# Patient Record
Sex: MALE | Race: WHITE | NOT HISPANIC OR LATINO | URBAN - METROPOLITAN AREA
[De-identification: names, ages, dates, MRNs, and addresses within clinical notes are randomized per-mention and may not be internally consistent; named-entity substitution may affect disease eponyms.]

---

## 2017-01-25 ENCOUNTER — OFFICE VISIT (OUTPATIENT)
Dept: OPTOMETRY | Facility: CLINIC | Age: 47
End: 2017-01-25
Payer: COMMERCIAL

## 2017-01-25 DIAGNOSIS — H52.203 MYOPIA WITH ASTIGMATISM AND PRESBYOPIA, BILATERAL: Primary | ICD-10-CM

## 2017-01-25 DIAGNOSIS — H52.4 MYOPIA WITH ASTIGMATISM AND PRESBYOPIA, BILATERAL: Primary | ICD-10-CM

## 2017-01-25 DIAGNOSIS — H40.053 OCULAR HYPERTENSION, BILATERAL: ICD-10-CM

## 2017-01-25 DIAGNOSIS — H52.13 MYOPIA WITH ASTIGMATISM AND PRESBYOPIA, BILATERAL: Primary | ICD-10-CM

## 2017-01-25 PROCEDURE — 92015 DETERMINE REFRACTIVE STATE: CPT | Performed by: OPTOMETRIST

## 2017-01-25 PROCEDURE — 92014 COMPRE OPH EXAM EST PT 1/>: CPT | Performed by: OPTOMETRIST

## 2017-01-25 ASSESSMENT — REFRACTION_MANIFEST
OS_AXIS: 009
OS_SPHERE: -2.50
OD_AXIS: 117
OD_AXIS: 155
OS_ADD: +2.00
METHOD_AUTOREFRACTION: 1
OD_CYLINDER: +1.00
OD_SPHERE: -2.50
OS_SPHERE: -2.25
OD_ADD: +2.00
OS_CYLINDER: +1.50
OS_CYLINDER: +0.50
OS_AXIS: 17
OD_SPHERE: -1.75
OD_CYLINDER: +0.25

## 2017-01-25 ASSESSMENT — SLIT LAMP EXAM - LIDS
COMMENTS: 1+ MEIBOMIAN GLAND DYSFUNCTION
COMMENTS: 1+ MEIBOMIAN GLAND DYSFUNCTION

## 2017-01-25 ASSESSMENT — REFRACTION_WEARINGRX
OS_CYLINDER: SPHERE
OD_CYLINDER: SPHERE
OD_SPHERE: -1.50
SPECS_TYPE: SVL
OS_SPHERE: -2.00

## 2017-01-25 ASSESSMENT — VISUAL ACUITY
OD_SC: 20/20-3
OS_CC: 20/30
OS_SC: 20/30-1
OD_CC: 20/20
OD_SC: 20/150
OS_SC: 20/150
METHOD: SNELLEN - LINEAR
OS_CC+: -1

## 2017-01-25 ASSESSMENT — EXTERNAL EXAM - LEFT EYE: OS_EXAM: NORMAL

## 2017-01-25 ASSESSMENT — EXTERNAL EXAM - RIGHT EYE: OD_EXAM: NORMAL

## 2017-01-25 ASSESSMENT — CUP TO DISC RATIO
OD_RATIO: 0.2
OS_RATIO: 0.2

## 2017-01-25 ASSESSMENT — CONF VISUAL FIELD
METHOD: COUNTING FINGERS
OS_NORMAL: 1
OD_NORMAL: 1

## 2017-01-25 ASSESSMENT — TONOMETRY
IOP_METHOD: APPLANATION
OS_IOP_MMHG: 23
OD_IOP_MMHG: 23

## 2017-01-25 NOTE — PROGRESS NOTES
Chief Complaint   Patient presents with     COMPREHENSIVE EYE EXAM         Last Eye Exam: 10/29/2015  Dilated Previously: Yes    What are you currently using to see?  glasses       Distance Vision Acuity: Satisfied with vision    Near Vision Acuity: Satisfied with vision while reading  unaided    Eye Comfort: good and when tired right eye will feel like it itchs  Do you use eye drops? : No  Occupation or Hobbies: [employer]    [facility]  [employer]           Medical, surgical and family histories reviewed and updated 1/25/2017.       OBJECTIVE: See Ophthalmology exam    ASSESSMENT:    ICD-10-CM    1. Myopia with astigmatism and presbyopia, bilateral H52.13 EYE EXAM (SIMPLE-NONBILLABLE)    H52.203 REFRACTION   2. Ocular hypertension, bilateral H40.053 EYE EXAM (SIMPLE-NONBILLABLE)      PLAN:     Patient Instructions   Prescription given for glasses.    Your eyes may be blurry at near and sensitive to light for several hours from the dilating drops.    Yearly eye exams recommended.

## 2017-01-25 NOTE — PATIENT INSTRUCTIONS
Prescription given for glasses.    Your eyes may be blurry at near and sensitive to light for several hours from the dilating drops.    Yearly eye exams recommended.

## 2018-02-12 ENCOUNTER — OFFICE VISIT (OUTPATIENT)
Dept: FAMILY MEDICINE | Facility: CLINIC | Age: 48
End: 2018-02-12
Payer: COMMERCIAL

## 2018-02-12 ENCOUNTER — RADIANT APPOINTMENT (OUTPATIENT)
Dept: GENERAL RADIOLOGY | Facility: CLINIC | Age: 48
End: 2018-02-12
Attending: NURSE PRACTITIONER
Payer: COMMERCIAL

## 2018-02-12 VITALS
HEIGHT: 72 IN | SYSTOLIC BLOOD PRESSURE: 138 MMHG | WEIGHT: 239.4 LBS | DIASTOLIC BLOOD PRESSURE: 87 MMHG | BODY MASS INDEX: 32.43 KG/M2 | TEMPERATURE: 97.5 F | OXYGEN SATURATION: 99 % | HEART RATE: 76 BPM

## 2018-02-12 DIAGNOSIS — M25.562 CHRONIC PAIN OF LEFT KNEE: ICD-10-CM

## 2018-02-12 DIAGNOSIS — M25.561 CHRONIC PAIN OF BOTH KNEES: ICD-10-CM

## 2018-02-12 DIAGNOSIS — M25.562 CHRONIC PAIN OF BOTH KNEES: ICD-10-CM

## 2018-02-12 DIAGNOSIS — G89.29 CHRONIC PAIN OF LEFT KNEE: Primary | ICD-10-CM

## 2018-02-12 DIAGNOSIS — G89.29 CHRONIC PAIN OF LEFT KNEE: ICD-10-CM

## 2018-02-12 DIAGNOSIS — G89.29 CHRONIC PAIN OF BOTH KNEES: ICD-10-CM

## 2018-02-12 DIAGNOSIS — M25.562 CHRONIC PAIN OF LEFT KNEE: Primary | ICD-10-CM

## 2018-02-12 LAB
ERYTHROCYTE [SEDIMENTATION RATE] IN BLOOD BY WESTERGREN METHOD: 3 MM/H (ref 0–15)
URATE SERPL-MCNC: 5.1 MG/DL (ref 3.5–7.2)

## 2018-02-12 PROCEDURE — 73562 X-RAY EXAM OF KNEE 3: CPT | Mod: LT

## 2018-02-12 PROCEDURE — 86618 LYME DISEASE ANTIBODY: CPT | Performed by: NURSE PRACTITIONER

## 2018-02-12 PROCEDURE — 85652 RBC SED RATE AUTOMATED: CPT | Performed by: NURSE PRACTITIONER

## 2018-02-12 PROCEDURE — 36415 COLL VENOUS BLD VENIPUNCTURE: CPT | Performed by: NURSE PRACTITIONER

## 2018-02-12 PROCEDURE — 86038 ANTINUCLEAR ANTIBODIES: CPT | Performed by: NURSE PRACTITIONER

## 2018-02-12 PROCEDURE — 99214 OFFICE O/P EST MOD 30 MIN: CPT | Performed by: NURSE PRACTITIONER

## 2018-02-12 PROCEDURE — 84550 ASSAY OF BLOOD/URIC ACID: CPT | Performed by: NURSE PRACTITIONER

## 2018-02-12 PROCEDURE — 86431 RHEUMATOID FACTOR QUANT: CPT | Performed by: NURSE PRACTITIONER

## 2018-02-12 RX ORDER — SODIUM PHOSPHATE,MONO-DIBASIC 19G-7G/118
1 ENEMA (ML) RECTAL DAILY
Qty: 90 CAPSULE | Refills: 1 | Status: SHIPPED | OUTPATIENT
Start: 2018-02-12 | End: 2020-04-13

## 2018-02-12 NOTE — NURSING NOTE
Chief Complaint   Patient presents with     Musculoskeletal Problem       Initial /87  Pulse 76  Temp 97.5  F (36.4  C) (Oral)  Ht 6' (1.829 m)  Wt 239 lb 6.4 oz (108.6 kg)  SpO2 99%  BMI 32.47 kg/m2 Estimated body mass index is 32.47 kg/(m^2) as calculated from the following:    Height as of this encounter: 6' (1.829 m).    Weight as of this encounter: 239 lb 6.4 oz (108.6 kg).  Medication Reconciliation: complete     Jacqui Mendez MA

## 2018-02-12 NOTE — PROGRESS NOTES
SUBJECTIVE:   Ata Garcia is a 47 year old male who presents to clinic today for the following health issues:      Muscle/Joint Pain     Onset: ongoing for years-worsened 1 week    Description:   Location: left knee pain, now starting in R knee  Character: Sharp    Progression of Symptoms: worse    Accompanying Signs & Symptoms:  Other symptoms: pain worse when sitting for long periods of time, for example in car.  Pain behind knee cap    Precipitating factors:   Trauma or overuse: no     Alleviating factors:  Improved by: nothing  Therapies Tried and outcome: aleve, ibu, tylenol       Problem list and histories reviewed & adjusted, as indicated.  Additional history: as documented    Patient Active Problem List   Diagnosis     CARDIOVASCULAR SCREENING; LDL GOAL LESS THAN 160     Ocular hypertension     Obesity, Class I, BMI 30-34.9     Past Surgical History:   Procedure Laterality Date     COLONOSCOPY WITH CO2 INSUFFLATION N/A 10/5/2016    Procedure: COLONOSCOPY WITH CO2 INSUFFLATION;  Surgeon: Glenn Joiner DO;  Location: [facility] OR     HC REMOVAL GALLBLADDER  10/2004     TONSILLECTOMY & ADENOIDECTOMY  2001    for RAJIV     UVULECTOMY  2001    for RAJIV     VASECTOMY  2002       Social History   Substance Use Topics     Smoking status: Never Smoker     Smokeless tobacco: Never Used     Alcohol use Yes      Comment: occasionally     Family History   Problem Relation Age of Onset     Lymphoma Father      non-H     DIABETES Father      type 2     Breast Cancer Sister      DIABETES Maternal Grandfather      type 1     Glaucoma No family hx of      Macular Degeneration No family hx of      Hypertension No family hx of      CEREBROVASCULAR DISEASE No family hx of      Thyroid Disease No family hx of      Coronary Artery Disease No family hx of          Current Outpatient Prescriptions   Medication Sig Dispense Refill     glucosamine-chondroitin (GLUCOSAMINE CHONDR COMPLEX) 500-400 MG CAPS per capsule Take 1 capsule by  mouth daily 90 capsule 1     diclofenac (VOLTAREN) 50 MG EC tablet Take 1 tablet (50 mg) by mouth 3 times daily as needed for moderate pain 30 tablet 1     No Known Allergies  BP Readings from Last 3 Encounters:   02/12/18 138/87   10/05/16 118/77   09/13/16 119/82    Wt Readings from Last 3 Encounters:   02/12/18 239 lb 6.4 oz (108.6 kg)   09/30/16 218 lb (98.9 kg)   09/13/16 221 lb (100.2 kg)                  Labs reviewed in EPIC    Reviewed and updated as needed this visit by clinical staff  Tobacco  Meds  Med Hx  Surg Hx  Fam Hx  Soc Hx      Reviewed and updated as needed this visit by Provider         ROS:  Constitutional, HEENT, cardiovascular, pulmonary, GI, , musculoskeletal, neuro, skin, endocrine and psych systems are negative, except as otherwise noted.    OBJECTIVE:     /87  Pulse 76  Temp 97.5  F (36.4  C) (Oral)  Ht 6' (1.829 m)  Wt 239 lb 6.4 oz (108.6 kg)  SpO2 99%  BMI 32.47 kg/m2  Body mass index is 32.47 kg/(m^2).  GENERAL: healthy, alert and no distress  RESP: lungs clear to auscultation - no rales, rhonchi or wheezes  CV: regular rate and rhythm, normal S1 S2, no S3 or S4, no murmur, click or rub, no peripheral edema and peripheral pulses strong  MS: no gross musculoskeletal defects noted, no edema No pain with palpation of knees, no pain with passive ROM.   SKIN: no suspicious lesions or rashes  NEURO: Normal strength and tone, mentation intact and speech normal    Diagnostic Test Results:  See orders    ASSESSMENT/PLAN:         ICD-10-CM    1. Chronic pain of left knee M25.562 XR Knee Left 3 Views    G89.29     worsening   2. Chronic pain of both knees M25.561 Lyme Disease Scarlet with reflex to WB Serum    M25.562 Anti Nuclear Scarlet IgG by IFA with Reflex    G89.29 Rheumatoid factor     Erythrocyte sedimentation rate auto     Uric acid     glucosamine-chondroitin (GLUCOSAMINE CHONDR COMPLEX) 500-400 MG CAPS per capsule     diclofenac (VOLTAREN) 50 MG EC tablet    left first, now  right too       See Patient Instructions: Your xray looks normal to me, I will notify you if the radiologist sees any abnormalities.  I will let you know your lab results when I get them back, and we can go from there based on treatment.  Follow up if your symptoms worsen.     Jane Dwyer, LEAH  [facility]

## 2018-02-12 NOTE — PATIENT INSTRUCTIONS
Your xray looks normal to me, I will notify you if the radiologist sees any abnormalities.  I will let you know your lab results when I get them back, and we can go from there based on treatment.  Follow up if your symptoms worsen.   Reducing Knee Pain and Swelling    Many treatments can help reduce pain and swelling in your knee. Your healthcare provider or physical therapist may suggest one or more of the following treatments:    Icing your knee helps reduce swelling. You may be asked to ice your knee once a day or more. Apply ice for about 15 to 20 minutes at a time, with at least 40 minutes between sessions. Always keep a towel between the ice and your skin.     Keeping your leg raised above your heart helps excess fluid flow out of your knee joint. This reduces swelling.    Compression means wrapping an elastic bandage or neoprene sleeve snugly around your knees. This keeps fluid from collecting in your knee joint.    Electrical stimulation, done by a physical therapist or , can help reduce excess fluid in your knee joint.    Anti-inflammatory medicines may be prescribed by your healthcare provider. You may take pills or receive injections in your knee.    Isometric (mariaa) exercises strengthen the muscles that support your knee joint. They also help reduce excess fluid in your knee.    Massage helps fluid drain away from your knee.  Date Last Reviewed: 10/13/2015    3893-3203 [employer]. [address] [place] [address], [place] [address]. All rights reserved. This information is not intended as a substitute for professional medical care. Always follow your healthcare professional's instructions.            Understanding Osteoarthritis of the Knee    A joint is a place where two bones meet. The knee is called a hinge joint. This joint is formed where the thighbone (femur) meets the shinbone (tibia). A healthy knee joint bends freely. Knee osteoarthritis is a condition where parts of  the knee joint wear out. This can lead to pain, stiffness, and limited movement.   What is osteoarthritis?  Every joint contains a smooth tissue called cartilage. Cartilage cushions the ends of bones and helps bones in a joint glide smoothly against each other. Knee osteoarthritis occurs when cartilage in the knee joint begins to break down and wear away. Bones may become exposed and rub together. The cartilage may become irritated and rough. This prevents smooth movement of the joint and can lead to pain.  Causes of osteoarthritis of the knee  Causes can include:    Wear and tear from normal use over time    Overuse of the knee during sports or work activities    Being overweight. This increases stress on the knee joint.    Misalignment of the knee joint    Injury to the knee  Symptoms of osteoarthritis of the knee  Common symptoms include:    Pain and swelling around the joint. The pain and swelling get worse with activity and better with rest.    Grinding sound when moving the knee    Reduced knee movement    Knee stiffness. This is often worse first thing in the morning.  Treating osteoarthritis of the knee  Osteoarthritis is a long-term condition. Treatment usually focuses on managing symptoms. Treatment may include:    Over-the-counter or prescription medicines taken by mouth to help relieve pain and swelling    Injections of medicine into the joint to help relieve symptoms for a time    A weight-loss plan for people who are overweight    A plan of physical therapy and exercises to improve the strength and flexibility of the muscles around the knee    Heat or cold therapy to help relieve pain and stiffness    Assistive devices that help with movement, such as a cane or a walker    Assistive devices that make activities of daily life easier, such as raised toilet seats or shower bars  If other treatments don t do enough to relieve symptoms, you may need surgery to replace the joint. During this surgery, the  damaged joint is removed. An artificial knee joint is then put into place. This can help relieve pain and stiffness and restore movement of the knee.     When to call your healthcare provider  Call your healthcare provider right away if you have any of these:    Fever of 100.4 F (38 C) or higher, or as directed    Symptoms that don t get better with prescribed medicines or get worse    New symptoms   Date Last Reviewed: 3/10/2016    [id number] [employer]. [address] [place] [address], [place] [address]. All rights reserved. This information is not intended as a substitute for professional medical care. Always follow your healthcare professional's instructions.

## 2018-02-13 LAB
ANA SER QL IF: NEGATIVE
B BURGDOR IGG+IGM SER QL: 0.01 (ref 0–0.89)
RHEUMATOID FACT SER NEPH-ACNC: <20 IU/ML (ref 0–20)

## 2018-02-13 NOTE — PROGRESS NOTES
Jorge Berumen,    Thank you for your recent office visit.    Here are your recent results.  Lab results normal.     Feel free to contact me via [employer] or call the clinic at [phone].    Sincerely,    WILLY Lazcano, FNP-BC

## 2018-02-13 NOTE — PROGRESS NOTES
Jorge Berumen,    Thank you for your recent office visit.    Here are your recent results.  Xray showed: Minimal quadriceps and patellar spurring.  If you would like a referral for orthopedics to further discuss this, please let me know.     Feel free to contact me via [employer] or call the clinic at [phone].    Sincerely,    WILLY Lazcano, FNP-BC

## 2018-08-31 ENCOUNTER — OFFICE VISIT (OUTPATIENT)
Dept: FAMILY MEDICINE | Facility: CLINIC | Age: 48
End: 2018-08-31
Payer: COMMERCIAL

## 2018-08-31 ENCOUNTER — RADIANT APPOINTMENT (OUTPATIENT)
Dept: GENERAL RADIOLOGY | Facility: CLINIC | Age: 48
End: 2018-08-31
Attending: FAMILY MEDICINE
Payer: COMMERCIAL

## 2018-08-31 VITALS
HEIGHT: 72 IN | DIASTOLIC BLOOD PRESSURE: 83 MMHG | BODY MASS INDEX: 32.23 KG/M2 | TEMPERATURE: 98.6 F | WEIGHT: 238 LBS | SYSTOLIC BLOOD PRESSURE: 134 MMHG | OXYGEN SATURATION: 97 % | HEART RATE: 73 BPM | RESPIRATION RATE: 20 BRPM

## 2018-08-31 DIAGNOSIS — R10.12 LUQ ABDOMINAL PAIN: ICD-10-CM

## 2018-08-31 DIAGNOSIS — R07.9 LEFT SIDED CHEST PAIN: ICD-10-CM

## 2018-08-31 DIAGNOSIS — R07.9 LEFT SIDED CHEST PAIN: Primary | ICD-10-CM

## 2018-08-31 DIAGNOSIS — Z11.4 SCREENING FOR HUMAN IMMUNODEFICIENCY VIRUS: ICD-10-CM

## 2018-08-31 DIAGNOSIS — Z23 NEED FOR PROPHYLACTIC VACCINATION AND INOCULATION AGAINST INFLUENZA: ICD-10-CM

## 2018-08-31 LAB
BASOPHILS # BLD AUTO: 0.1 10E9/L (ref 0–0.2)
BASOPHILS NFR BLD AUTO: 0.6 %
DIFFERENTIAL METHOD BLD: NORMAL
EOSINOPHIL # BLD AUTO: 0.2 10E9/L (ref 0–0.7)
EOSINOPHIL NFR BLD AUTO: 1.7 %
ERYTHROCYTE [DISTWIDTH] IN BLOOD BY AUTOMATED COUNT: 13.1 % (ref 10–15)
HCT VFR BLD AUTO: 44.7 % (ref 40–53)
HGB BLD-MCNC: 15.7 G/DL (ref 13.3–17.7)
LYMPHOCYTES # BLD AUTO: 2.4 10E9/L (ref 0.8–5.3)
LYMPHOCYTES NFR BLD AUTO: 27.7 %
MCH RBC QN AUTO: 29.5 PG (ref 26.5–33)
MCHC RBC AUTO-ENTMCNC: 35.1 G/DL (ref 31.5–36.5)
MCV RBC AUTO: 84 FL (ref 78–100)
MONOCYTES # BLD AUTO: 0.7 10E9/L (ref 0–1.3)
MONOCYTES NFR BLD AUTO: 7.5 %
NEUTROPHILS # BLD AUTO: 5.5 10E9/L (ref 1.6–8.3)
NEUTROPHILS NFR BLD AUTO: 62.5 %
PLATELET # BLD AUTO: 188 10E9/L (ref 150–450)
RBC # BLD AUTO: 5.33 10E12/L (ref 4.4–5.9)
WBC # BLD AUTO: 8.8 10E9/L (ref 4–11)

## 2018-08-31 PROCEDURE — 71046 X-RAY EXAM CHEST 2 VIEWS: CPT | Mod: FY

## 2018-08-31 PROCEDURE — 87389 HIV-1 AG W/HIV-1&-2 AB AG IA: CPT | Performed by: FAMILY MEDICINE

## 2018-08-31 PROCEDURE — 99214 OFFICE O/P EST MOD 30 MIN: CPT | Mod: 25 | Performed by: FAMILY MEDICINE

## 2018-08-31 PROCEDURE — 90471 IMMUNIZATION ADMIN: CPT | Performed by: FAMILY MEDICINE

## 2018-08-31 PROCEDURE — 90686 IIV4 VACC NO PRSV 0.5 ML IM: CPT | Performed by: FAMILY MEDICINE

## 2018-08-31 PROCEDURE — 85025 COMPLETE CBC W/AUTO DIFF WBC: CPT | Performed by: FAMILY MEDICINE

## 2018-08-31 PROCEDURE — 36415 COLL VENOUS BLD VENIPUNCTURE: CPT | Performed by: FAMILY MEDICINE

## 2018-08-31 ASSESSMENT — PAIN SCALES - GENERAL: PAINLEVEL: MILD PAIN (2)

## 2018-08-31 NOTE — PATIENT INSTRUCTIONS
At [facility], we strive to deliver an exceptional experience to you, every time we see you.  If you receive a survey in the mail, please send us back your thoughts. We really do value your feedback.    Based on your medical history, these are the current health maintenance/preventive care services that you are due for (some may have been done at this visit.)  Health Maintenance Due   Topic Date Due     HIV SCREEN (SYSTEM ASSIGNED)  06/29/1988     PHQ-2 Q1 YR  09/13/2017         Suggested websites for health information:  Www.[facility].org : Up to date and easily searchable information on multiple topics.  Www.medlineplus.gov : medication info, interactive tutorials, watch real surgeries online  Www.familydoctor.org : good info from the Academy of Family Physicians  Www.cdc.gov : public health info, travel advisories, epidemics (H1N1)  Www.aap.org : children's health info, normal development, vaccinations  Www.health.[facility].[place].us : [place] dept of health, public health issues in [place], N1N1    Your care team:                            Family Medicine Internal Medicine   MD Kwesi Farah MD Shantel Branch-Fleming, MD Katya Georgiev PA-C Nam Ho, MD Pediatrics   LINO Patel, DARYL AGUILERA CNP   MD Elizabeth Ye MD Deborah Mielke, MD Kim Thein, APRN [facility]      Clinic hours: Monday - Thursday 7 am-7 pm; Fridays 7 am-5 pm.   Urgent care: Monday - Friday 11 am-9 pm; Saturday and Sunday 9 am-5 pm.  Pharmacy : Monday -Thursday 8 am-8 pm; Friday 8 am-6 pm; Saturday and Sunday 9 am-5 pm.     Clinic: [phone]   Pharmacy: [phone]       Please call the [facility]  [phone] to schedule your chest and abdominal CT scans.

## 2018-08-31 NOTE — PROGRESS NOTES
SUBJECTIVE:   Ata Garcia is a 48 year old male who presents to clinic today for the following health issues:    CHEST PAIN     Onset: 6 weeks    Description:   Location: mostly on the left side below the pectoral, some similar pain on the right side. He also complains of a 'heartburn' like feeling in the center of the chest and upper abdomen  Character: achey and pressure  Radiation: as above  Duration: episodes typically last 40 minutes     Intensity: moderate    Progression of Symptoms: same and intermittent    Accompanying Signs & Symptoms:  Shortness of breath: YES  Sweating: no  Nausea/vomiting: no  Lightheadedness: no  Palpitations: no  Fever/Chills: no  Cough: YES  Heartburn: YES    History:   History of similar symptoms: YES - patient complained about this problem approximately 2 years ago but he states that it has been intermittent until about 6 weeks ago when the symptoms worsened again  Family history of heart disease: no  Tobacco use: no  Patient has had his gallbladder removed    Precipitating factors:   Worse with exertion: no  Worse with deep breaths :  no  Related to food: no    Alleviating factors:  none       Therapies Tried and outcome: ibuprofen - no relief    Past medical, family, and social histories, medications, and allergies are reviewed and updated in [facility].     ROS:  CONSTITUTIONAL: NEGATIVE for fever, chills, change in weight  ENT/MOUTH: NEGATIVE for ear, mouth and throat problems  RESP: NEGATIVE for significant cough  CV: as above  ROS otherwise negative    This document serves as a record of the services and decisions personally performed and made by Dr. Faye. It was created on his behalf by Carole Manning, a trained medical scribe. The creation of this document is based the provider's statements to the medical scribe.  Carole Manning August 31, 2018 3:32 PM     OBJECTIVE:                                                    /83 (BP Location: Left arm, Patient Position: Chair,  Cuff Size: Adult Large)  Pulse 73  Temp 98.6  F (37  C) (Oral)  Resp 20  Ht 1.829 m (6')  Wt 108 kg (238 lb)  SpO2 97%  BMI 32.28 kg/m2   Body mass index is 32.28 kg/(m^2).     GENERAL: healthy, alert and no distress  EYES: Eyes grossly normal to inspection, PERRL, EOMI, sclerae white and conjunctivae normal  RESP: lungs clear to auscultation - no crackles or wheezes, no areas of dullness, no tachypnea  CV: Heart regular rate and rhythm without murmur, click or rub. No peripheral edema and peripheral pulses strong  ABDOMEN: soft, nontender, without hepatosplenomegaly or masses and bowel sounds normal   MS: no gross musculoskeletal defects noted, no edema  SKIN: no suspicious lesions or rashes  NEURO: Normal strength and tone, sensory exam grossly normal, mentation intact, oriented times 3 and cranial nerves 2-12 intact  PSYCH: mentation appears normal, affect normal/bright     Diagnostic Test Results:  Results for orders placed or performed in visit on 08/31/18   CBC with platelets differential   Result Value Ref Range    WBC 8.8 4.0 - 11.0 10e9/L    RBC Count 5.33 4.4 - 5.9 10e12/L    Hemoglobin 15.7 13.3 - 17.7 g/dL    Hematocrit 44.7 40.0 - 53.0 %    MCV 84 78 - 100 fl    MCH 29.5 26.5 - 33.0 pg    MCHC 35.1 31.5 - 36.5 g/dL    RDW 13.1 10.0 - 15.0 %    Platelet Count 188 150 - 450 10e9/L    Diff Method Automated Method     % Neutrophils 62.5 %    % Lymphocytes 27.7 %    % Monocytes 7.5 %    % Eosinophils 1.7 %    % Basophils 0.6 %    Absolute Neutrophil 5.5 1.6 - 8.3 10e9/L    Absolute Lymphocytes 2.4 0.8 - 5.3 10e9/L    Absolute Monocytes 0.7 0.0 - 1.3 10e9/L    Absolute Eosinophils 0.2 0.0 - 0.7 10e9/L    Absolute Basophils 0.1 0.0 - 0.2 10e9/L   A Chest X-Ray was ordered. My reading of this film is normal. (No comparison films available: pending review by Radiologist.)      ASSESSMENT/PLAN:                                                      (R07.9) Left sided chest pain  (primary encounter  diagnosis)  Comment: chronic intermittent, perhaps this is pleuritic  Plan: XR Chest 2 Views, CBC with platelets         differential, CT Chest w Contrast        Consider indomethacin if the CT scan is negative    (R10.12) LUQ abdominal pain  Comment: some of his discomfort seems to be present below the rib margin  Plan: XR Chest 2 Views, CBC with platelets         differential, CT Abdomen w Contrast          (Z11.4) Screening for human immunodeficiency virus  Comment: indications for screening discussed with the patient   Plan: HIV Antigen Antibody Combo          (Z23) Need for prophylactic vaccination and inoculation against influenza  Comment: Influenza vaccine offered and accepted by patient. He has received it before without problems.   Plan: FLU VAC PRESRV FREE QUAD SPLIT VIR, IM (3+         YRS), ADMIN 1st VACCINE              The information in this document, created by the medical scribe for me, accurately reflects the services I personally performed and the decisions made by me. I have reviewed and approved this document for accuracy prior to leaving the patient care area. August 31, 2018 3:32 PM     Glenn Faye MD

## 2018-08-31 NOTE — NURSING NOTE
Injectable Influenza Immunization Documentation    1.  Has the patient received the information for the injectable influenza vaccine? YES     2. Is the patient 6 months of age or older? YES     3. Does the patient have any of the following contraindications?         Severe allergy to eggs? No     Severe allergic reaction to previous influenza vaccines? No   Severe allergy to latex? No       History of Guillain-McClave syndrome? No     Currently have a temperature greater than 100.4F? No     Prior to injection verified patient identity using patient's name and date of birth.  Due to injection administration, patient instructed to remain in clinic for 15 minutes  afterwards, and to report any adverse reaction to me immediately.       Vaccination given by Rosita Dockery MA

## 2018-08-31 NOTE — MR AVS SNAPSHOT
After Visit Summary   8/31/2018    Ata Garcia    MRN: [id number]           Patient Information     Date Of Birth          1970        Visit Information        Provider Department      8/31/2018 3:20 PM Glenn Faye MD [facility]        Today's Diagnoses     Left sided chest pain    -  1    LUQ abdominal pain        Screening for human immunodeficiency virus        Need for prophylactic vaccination and inoculation against influenza          Care Instructions    At [facility], we strive to deliver an exceptional experience to you, every time we see you.  If you receive a survey in the mail, please send us back your thoughts. We really do value your feedback.    Based on your medical history, these are the current health maintenance/preventive care services that you are due for (some may have been done at this visit.)  Health Maintenance Due   Topic Date Due     HIV SCREEN (SYSTEM ASSIGNED)  06/29/1988     PHQ-2 Q1 YR  09/13/2017         Suggested websites for health information:  Www.IndiaEver.com : Up to date and easily searchable information on multiple topics.  Www.medlineplus.gov : medication info, interactive tutorials, watch real surgeries online  Www.familydoctor.org : good info from the Academy of Family Physicians  Www.cdc.gov : public health info, travel advisories, epidemics (H1N1)  Www.aap.org : children's health info, normal development, vaccinations  Www.health.state.[place].us : [place] dept of health, public health issues in [place], N1N1    Your care team:                            Family Medicine Internal Medicine   MD Kwesi Farah MD Shantel Branch-Fleming, MD Katya Georgiev PA-C Nam Ho, MD Pediatrics   LINO Patel, MD Elizabeth Goldman CNP, MD Deborah Mielke, MD Kim Thein, WILLY CNP      Clinic hours: Monday - Thursday 7 am-7 pm; Fridays 7 am-5 pm.    Urgent care: Monday - Friday 11 am-9 pm; Saturday and Sunday 9 am-5 pm.  Pharmacy : Monday -Thursday 8 am-8 pm; Friday 8 am-6 pm; Saturday and Sunday 9 am-5 pm.     Clinic: [phone]   Pharmacy: [phone]       Please call the [facility]  [phone] to schedule your chest and abdominal CT scans.           Follow-ups after your visit        Future tests that were ordered for you today     Open Future Orders        Priority Expected Expires Ordered    CT Chest w Contrast Routine  8/31/2019 8/31/2018    CT Abdomen w Contrast Routine  8/31/2019 8/31/2018            Who to contact     If you have questions or need follow up information about today's clinic visit or your schedule please contact [facility] directly at [phone].  Normal or non-critical lab and imaging results will be communicated to you by MyChart, letter or phone within 4 business days after the clinic has received the results. If you do not hear from us within 7 days, please contact the clinic through [email or web address] or phone. If you have a critical or abnormal lab result, we will notify you by phone as soon as possible.  Submit refill requests through [employer] or call your pharmacy and they will forward the refill request to us. Please allow 3 business days for your refill to be completed.          Additional Information About Your Visit        [email or web address] Information     [employer] gives you secure access to your electronic health record. If you see a primary care provider, you can also send messages to your care team and make appointments. If you have questions, please call your primary care clinic.  If you do not have a primary care provider, please call [phone] and they will assist you.        Care EveryWhere ID     This is your Care EveryWhere ID. This could be used by other organizations to access your [place] medical records  [id number]        Your Vitals Were     Pulse Temperature  Respirations Height Pulse Oximetry BMI (Body Mass Index)    73 98.6  F (37  C) (Oral) 20 6' (1.829 m) 97% 32.28 kg/m2       Blood Pressure from Last 3 Encounters:   08/31/18 134/83   02/12/18 138/87   10/05/16 118/77    Weight from Last 3 Encounters:   08/31/18 238 lb (108 kg)   02/12/18 239 lb 6.4 oz (108.6 kg)   09/30/16 218 lb (98.9 kg)              We Performed the Following     ADMIN 1st VACCINE     CBC with platelets differential     FLU VAC PRESRV FREE QUAD SPLIT VIR, IM (3+ YRS)     HIV Antigen Antibody Combo        Primary Care Provider Fax #    Physician No Ref-Primary [phone]       No address on file        Equal Access to Services     PAVEL [place] : Junior bauman Sobertha, waaxda luqadaha, qaybta kaalmada adeegyaharjinder, ness donnelly . So [facility] [phone].    ATENCIÓN: Si habla español, tiene a lucas disposición servicios gratuitos de asistencia lingüística. Llame al [phone].    We comply with applicable federal civil rights laws and [place] laws. We do not discriminate on the basis of race, color, national origin, age, disability, sex, sexual orientation, or gender identity.            Thank you!     Thank you for choosing [facility]  for your care. Our goal is always to provide you with excellent care. Hearing back from our patients is one way we can continue to improve our services. Please take a few minutes to complete the written survey that you may receive in the mail after your visit with us. Thank you!             Your Updated Medication List - Protect others around you: Learn how to safely use, store and throw away your medicines at www.disposemymeds.org.          This list is accurate as of 8/31/18  4:33 PM.  Always use your most recent med list.                   Brand Name Dispense Instructions for use Diagnosis    ALEVE PO           glucosamine-chondroitin 500-400 MG Caps per capsule    GLUCOSAMINE CHONDR COMPLEX    90 capsule     Take 1 capsule by mouth daily    Chronic pain of both knees

## 2018-09-04 LAB — HIV 1+2 AB+HIV1 P24 AG SERPL QL IA: NONREACTIVE

## 2018-09-17 ENCOUNTER — RADIANT APPOINTMENT (OUTPATIENT)
Dept: CT IMAGING | Facility: CLINIC | Age: 48
End: 2018-09-17
Attending: FAMILY MEDICINE
Payer: COMMERCIAL

## 2018-09-17 DIAGNOSIS — R07.9 LEFT SIDED CHEST PAIN: ICD-10-CM

## 2018-09-17 PROCEDURE — 71260 CT THORAX DX C+: CPT | Mod: TC

## 2018-09-17 RX ORDER — IOPAMIDOL 755 MG/ML
200 INJECTION, SOLUTION INTRAVASCULAR ONCE
Status: COMPLETED | OUTPATIENT
Start: 2018-09-17 | End: 2018-09-17

## 2018-09-17 RX ADMIN — Medication 50 ML: at 16:33

## 2018-09-17 RX ADMIN — IOPAMIDOL 100 ML: 755 INJECTION, SOLUTION INTRAVASCULAR at 16:33

## 2018-12-17 ENCOUNTER — MYC MEDICAL ADVICE (OUTPATIENT)
Dept: FAMILY MEDICINE | Facility: CLINIC | Age: 48
End: 2018-12-17

## 2018-12-26 ENCOUNTER — OFFICE VISIT (OUTPATIENT)
Dept: FAMILY MEDICINE | Facility: CLINIC | Age: 48
End: 2018-12-26
Payer: COMMERCIAL

## 2018-12-26 VITALS
DIASTOLIC BLOOD PRESSURE: 89 MMHG | SYSTOLIC BLOOD PRESSURE: 145 MMHG | OXYGEN SATURATION: 95 % | TEMPERATURE: 97.8 F | HEART RATE: 83 BPM | BODY MASS INDEX: 32.28 KG/M2 | WEIGHT: 238 LBS

## 2018-12-26 DIAGNOSIS — N52.9 ERECTILE DYSFUNCTION, UNSPECIFIED ERECTILE DYSFUNCTION TYPE: Primary | ICD-10-CM

## 2018-12-26 PROCEDURE — 99214 OFFICE O/P EST MOD 30 MIN: CPT | Performed by: FAMILY MEDICINE

## 2018-12-26 RX ORDER — SILDENAFIL 100 MG/1
TABLET, FILM COATED ORAL
Qty: 20 TABLET | Refills: 3 | Status: SHIPPED | OUTPATIENT
Start: 2018-12-26 | End: 2020-06-30

## 2018-12-26 ASSESSMENT — PAIN SCALES - GENERAL: PAINLEVEL: MILD PAIN (3)

## 2018-12-26 NOTE — PATIENT INSTRUCTIONS
At [facility], we strive to deliver an exceptional experience to you, every time we see you.  If you receive a survey in the mail, please send us back your thoughts. We really do value your feedback.    Your care team:                            Family Medicine Internal Medicine   MD Kwesi Farah MD Shantel Branch-Fleming, MD Katya Georgiev PA-C Megan Hill, APRN DARYL Rubin MD Pediatrics   Leonel Rodas, LINO Alva, MD Marisabel Garcia APRN CNP   MD Elizabeth Ye MD Deborah Mielke, MD Lauren Jauregui, APRN [facility]      Clinic hours: Monday - Thursday 7 am-7 pm; Fridays 7 am-5 pm.   Urgent care: Monday - Friday 11 am-9 pm; Saturday and Sunday 9 am-5 pm.  Pharmacy : Monday -Thursday 8 am-8 pm; Friday 8 am-6 pm; Saturday and Sunday 9 am-5 pm.     Clinic: [phone]   Pharmacy: [phone]

## 2018-12-26 NOTE — PROGRESS NOTES
SUBJECTIVE:   Ata Garcia is a 48 year old male who presents to clinic today for the following health issues:    Erectile dysfunction. Patient stated he has been having intermittent problems with ED for the last few years and more so recently. Patient able to get an erection but sometimes does not last long enough for intercourse. No urinary symptoms. No urinary discharges. Patient has one partner, his wife, of 19 years.      Problem list and histories reviewed & adjusted, as indicated.  Additional history: as documented    Patient Active Problem List   Diagnosis     CARDIOVASCULAR SCREENING; LDL GOAL LESS THAN 160     Ocular hypertension     Obesity, Class I, BMI 30-34.9     Past Surgical History:   Procedure Laterality Date     COLONOSCOPY WITH CO2 INSUFFLATION N/A 10/5/2016    Procedure: COLONOSCOPY WITH CO2 INSUFFLATION;  Surgeon: Glenn Joiner DO;  Location: [facility] OR     HC REMOVAL GALLBLADDER  10/2004     TONSILLECTOMY & ADENOIDECTOMY  2001    for RAJIV     UVULECTOMY  2001    for RAJIV     VASECTOMY  2002       Social History     Tobacco Use     Smoking status: Never Smoker     Smokeless tobacco: Never Used   Substance Use Topics     Alcohol use: Yes     Comment: occasionally     Family History   Problem Relation Age of Onset     Lymphoma Father         non-H     Diabetes Father         type 2     Breast Cancer Sister      Diabetes Maternal Grandfather         type 1     Glaucoma No family hx of      Macular Degeneration No family hx of      Hypertension No family hx of      Cerebrovascular Disease No family hx of      Thyroid Disease No family hx of      Coronary Artery Disease No family hx of            Reviewed and updated as needed this visit by clinical staff  Tobacco  Allergies  Meds  Med Hx  Surg Hx  Fam Hx  Soc Hx      Reviewed and updated as needed this visit by Provider         ROS:  Constitutional, HEENT, cardiovascular, pulmonary, GI, , musculoskeletal, neuro, skin, endocrine and psych  systems are negative, except as otherwise noted.    OBJECTIVE:     /89 (BP Location: Right arm, Patient Position: Sitting, Cuff Size: Adult Regular)   Pulse 83   Temp 97.8  F (36.6  C) (Oral)   Wt 108 kg (238 lb)   SpO2 95%   BMI 32.28 kg/m    Body mass index is 32.28 kg/m .  GENERAL: healthy, alert and no distress  NECK: no adenopathy, no asymmetry, masses, or scars and thyroid normal to palpation  RESP: lungs clear to auscultation - no rales, rhonchi or wheezes  CV: regular rate and rhythm, normal S1 S2, no S3 or S4, no murmur, click or rub, no peripheral edema and peripheral pulses strong  ABDOMEN: soft, nontender, no hepatosplenomegaly, no masses and bowel sounds normal  MS: no gross musculoskeletal defects noted, no edema    Diagnostic Test Results:  none     ASSESSMENT/PLAN:     1. Erectile dysfunction, unspecified erectile dysfunction type  Discussed likely performance anxiety as by history. Can trial Viagra as needed. No contraindications. Discussed potential side effects. RTC if no improvements.  - sildenafil (VIAGRA) 100 MG tablet; 1/2 tab to 1 tab 30 minutes to 4 hours before sexual activity  Dispense: 20 tablet; Refill: 3    See Patient Instructions    Wally Rubin MD, MD  [facility]

## 2019-11-08 ENCOUNTER — IMMUNIZATION (OUTPATIENT)
Dept: NURSING | Facility: CLINIC | Age: 49
End: 2019-11-08
Payer: COMMERCIAL

## 2019-11-08 DIAGNOSIS — Z23 NEED FOR PROPHYLACTIC VACCINATION AND INOCULATION AGAINST INFLUENZA: Primary | ICD-10-CM

## 2019-11-08 PROCEDURE — 90471 IMMUNIZATION ADMIN: CPT

## 2019-11-08 PROCEDURE — 99207 ZZC NO CHARGE NURSE ONLY: CPT

## 2019-11-08 PROCEDURE — 90686 IIV4 VACC NO PRSV 0.5 ML IM: CPT

## 2019-11-08 NOTE — PROGRESS NOTES
Panel Management Review      Summary:    Patient is due/failing the following:   Influenza vaccine    Action needed:   Patient needs nurse only appointment.    Type of outreach:    Verbal reminder given during ancillary appointment, patient received vaccine today.    Questions for provider review:    None                                                                                                                                    Tracy Mcdermott, URVASHI       Completed, no need to route chart.

## 2020-02-24 ENCOUNTER — HEALTH MAINTENANCE LETTER (OUTPATIENT)
Age: 50
End: 2020-02-24

## 2020-03-15 ENCOUNTER — VIRTUAL VISIT (OUTPATIENT)
Dept: FAMILY MEDICINE | Facility: OTHER | Age: 50
End: 2020-03-15

## 2020-03-15 NOTE — PROGRESS NOTES
"Date: 03/15/2020 10:51:08  Clinician: Chris Yost  Clinician NPI: [id number]  Patient: Ata Garcia  Patient : 1970  Patient Address: [address] [place] [address], [place] [address]  Patient Phone: [phone]  Visit Protocol: URI  Patient Summary:  Ata is a 49 year old ( : 1970 ) male who initiated a Visit for cold, sinus infection, or influenza. When asked the question \"Please sign me up to receive news, health information and promotions from [employer].\", Ata responded \"Yes\".    Ata states his symptoms started suddenly 1 month or more ago. After his symptoms started, they improved and then got worse again.   His symptoms consist of malaise, facial pain or pressure, myalgia, wheezing, a sore throat, a cough, and nasal congestion. He is experiencing difficulty breathing due to nasal congestion but he is not short of breath.   Symptom details     Nasal secretions: The color of his mucus is clear.    Cough: Ata coughs almost every minute and his cough is more bothersome at night. Phlegm comes into his throat when he coughs. He believes his cough is caused by post-nasal drip. The color of the phlegm is clear.     Sore throat: Ata reports having mild throat pain (1-3 on a 10 point pain scale), does not have exudate on his tonsils, and can swallow liquids. The lymph nodes in his neck are not enlarged. A rash has not appeared on the skin since the sore throat started.     Wheezing: Ata has not ever been diagnosed with asthma. The wheezing does not interfere with his normal daily activities.    Facial pain or pressure: He has not had the facial pain or pressure for 12 weeks or more. The facial pain or pressure does not feel worse when bending or leaning forward.      Ata denies having ear pain, fever, headache, rhinitis, enlarged lymph nodes, chills, and teeth pain. He also denies having a sinus infection within the past year, having recent facial or sinus surgery in the past 60 days, and taking " antibiotic medication for the symptoms.   Precipitating events  Ata is not sure if he has been exposed to someone with strep throat. He has recently been exposed to someone with influenza. Ata has not been in close contact with any high risk individuals.   Pertinent COVID-19 (Coronavirus) information  Ata has not traveled internationally or to the areas where COVID-19 (Coronavirus) is widespread in the last 14 days before the start of his symptoms.   Ata has not had close contact with a suspected or laboratory-confirmed COVID-19 patient within 14 days of symptom onset.   Ata is not a healthcare worker and does not work in a healthcare facility.   Pertinent medical history  Ata does not need a return to work/school note.   Weight: 240 lbs   Ata does not smoke or use smokeless tobacco.   Additional information as reported by the patient (free text): My daughter came down with what we thought was the flu.  I developed a sore throat and cough.  It was getting better.  We then went on a cruise, and the last day or 2 the tickle in my throat started to bug me more, and causes me to cough.  We just got back this morning from the cruise.   Weight: 240 lbs    MEDICATIONS: acetaminophen-DM oral, ALLERGIES: NKDA  Clinician Response:  Dear Ata,   Dear Ata,  Based on the information you have provided, it does not appear you need Coronavirus (COVID-19) testing at this time because you do not have fever and cough which are the primary symptoms of Coronavirus and known exposure. But because you have recently traveled on a cruise there is potential for exposure. If you are notified that someone on the cruise has tested positive we would then recommend screening, please resubmit [facility] visit if your exposure changes.&nbsp;  Please see below this COVID information about recommendations for your current symptoms.&nbsp;  We do recommend self-isolation for 14 days from the last day you may have been exposed.   What does  this mean?  Isolate Yourself:   Isolate yourself at home.   Do Not allow any visitors  Do Not go to work or school  Do Not go to [employer],  centers, shopping, or other public places.  Do Not shake hands.  Avoid close contact with others (hugging, kissing).   Protect Others:   Cover Your Mouth and Nose with a mask, disposable tissue or wash cloth to avoid spreading germs to others.  Wash your hands and face frequently with soap and water.   If you have not developed a cough with fever by day 15 of isolation you are considered uninfected.  If you develop cough and fever, submit a new visit to us so we can arrange [facility] COVID testing.   Thank you for limiting contact with others, wearing a simple mask to cover your cough, practice good hand hygiene habits and accessing our virtual services where possible to limit the spread of this virus.  For more information about COVID19 and options for caring for yourself at home, please visit the CDC website at https://www.cdc.gov/coronavirus/2019-ncov/about/steps-when-sick.html  For more options for care at [facility], please visit our website at [email or web address]     It does sound that you potentially have some viral Sinusitis. &nbsp;If the nasal congestion and sinus pressure is not severe recommend starting to use Nasal saline rinses (Nettipot/Neilmed nasal rinse), Flonase or Nasacort nasal sprays, decongestants over the counter to help with symptoms. &nbsp;If you start to develop more sinus symptoms and pressure recommend resubmitting to [facility] to potentially be prescribed antibiotics at that time.&nbsp;    Diagnosis: Cough  Diagnosis ICD: R05

## 2020-04-13 ENCOUNTER — VIRTUAL VISIT (OUTPATIENT)
Dept: FAMILY MEDICINE | Facility: CLINIC | Age: 50
End: 2020-04-13
Payer: COMMERCIAL

## 2020-04-13 DIAGNOSIS — R05.9 COUGH: Primary | ICD-10-CM

## 2020-04-13 DIAGNOSIS — R05.8 COUGH PRESENT FOR GREATER THAN 3 WEEKS: ICD-10-CM

## 2020-04-13 PROCEDURE — 99213 OFFICE O/P EST LOW 20 MIN: CPT | Mod: TEL | Performed by: NURSE PRACTITIONER

## 2020-04-13 RX ORDER — BENZONATATE 100 MG/1
100-200 CAPSULE ORAL 3 TIMES DAILY PRN
Qty: 42 CAPSULE | Refills: 0 | Status: SHIPPED | OUTPATIENT
Start: 2020-04-13 | End: 2021-06-01

## 2020-04-13 RX ORDER — AZITHROMYCIN 250 MG/1
TABLET, FILM COATED ORAL
Qty: 6 TABLET | Refills: 0 | Status: SHIPPED | OUTPATIENT
Start: 2020-04-13 | End: 2020-04-18

## 2020-04-13 RX ORDER — PREDNISONE 20 MG/1
20 TABLET ORAL DAILY
Qty: 5 TABLET | Refills: 0 | Status: SHIPPED | OUTPATIENT
Start: 2020-04-13 | End: 2021-06-01

## 2020-04-13 NOTE — PROGRESS NOTES
"Ata Garcia is a 49 year old male who is being evaluated via a billable telephone visit.      The patient has been notified of following:     \"This telephone visit will be conducted via a call between you and your physician/provider. We have found that certain health care needs can be provided without the need for a physical exam.  This service lets us provide the care you need with a short phone conversation.  If a prescription is necessary we can send it directly to your pharmacy.  If lab work is needed we can place an order for that and you can then stop by our lab to have the test done at a later time.    Telephone visits are billed at different rates depending on your insurance coverage. During this emergency period, for some insurers they may be billed the same as an in-person visit.  Please reach out to your insurance provider with any questions.    If during the course of the call the physician/provider feels a telephone visit is not appropriate, you will not be charged for this service.\"    Patient has given verbal consent for Telephone visit?  Yes    How would you like to obtain your AVS? MyChart    Subjective     Ata Garcia is a 49 year old male who presents to clinic today for the following health issues:    cough      Duration: 11 weeks    Description (location/character/radiation): cough-ongoing, tickle in back of throat type of cough- triggers coughing fits.  Has some shortness of breath and an audible wheeze. Does not feel cough is improving. Non-productive.     Intensity:  mild, moderate    History (similar episodes/previous evaluation): In January his kids had flu like symptoms-tested negative for influenza . Beginning of March was on a cruise - came back March 15 th.    Therapies tried and outcome: allegra-mild change       Patient Active Problem List   Diagnosis     CARDIOVASCULAR SCREENING; LDL GOAL LESS THAN 160     Ocular hypertension     Obesity, Class I, BMI 30-34.9     Past Surgical " History:   Procedure Laterality Date     COLONOSCOPY WITH CO2 INSUFFLATION N/A 10/5/2016    Procedure: COLONOSCOPY WITH CO2 INSUFFLATION;  Surgeon: Glenn Joiner DO;  Location: [facility] OR     HC REMOVAL GALLBLADDER  10/2004     TONSILLECTOMY & ADENOIDECTOMY  2001    for RAJIV     UVULECTOMY  2001    for RAJIV     VASECTOMY  2002       Social History     Tobacco Use     Smoking status: Never Smoker     Smokeless tobacco: Never Used   Substance Use Topics     Alcohol use: Yes     Comment: occasionally     Family History   Problem Relation Age of Onset     Lymphoma Father         non-H     Diabetes Father         type 2     Breast Cancer Sister      Diabetes Maternal Grandfather         type 1     Glaucoma No family hx of      Macular Degeneration No family hx of      Hypertension No family hx of      Cerebrovascular Disease No family hx of      Thyroid Disease No family hx of      Coronary Artery Disease No family hx of          Current Outpatient Medications   Medication Sig Dispense Refill     azithromycin (ZITHROMAX) 250 MG tablet Take 2 tablets (500 mg) by mouth daily for 1 day, THEN 1 tablet (250 mg) daily for 4 days. 6 tablet 0     benzonatate (TESSALON) 100 MG capsule Take 1-2 capsules (100-200 mg) by mouth 3 times daily as needed for cough 42 capsule 0     predniSONE (DELTASONE) 20 MG tablet Take 1 tablet (20 mg) by mouth daily 5 tablet 0     sildenafil (VIAGRA) 100 MG tablet 1/2 tab to 1 tab 30 minutes to 4 hours before sexual activity 20 tablet 3     No Known Allergies  Recent Labs   Lab Test 09/13/16  0742   LDL 99   HDL 35*   TRIG 207*   ALT 44   CR 0.99   GFRESTIMATED 81   GFRESTBLACK >90   GFR Calc     POTASSIUM 4.1      BP Readings from Last 3 Encounters:   12/26/18 145/89   08/31/18 134/83   02/12/18 138/87    Wt Readings from Last 3 Encounters:   12/26/18 108 kg (238 lb)   08/31/18 108 kg (238 lb)   02/12/18 108.6 kg (239 lb 6.4 oz)                    Reviewed and updated as needed this  visit by Provider  Tobacco  Allergies  Meds  Problems  Med Hx  Surg Hx  Fam Hx         Review of Systems   ROS COMP: Constitutional, HEENT, cardiovascular, pulmonary, GI, , musculoskeletal, neuro, skin, endocrine and psych systems are negative, except as otherwise noted.       Objective   Reported vitals:  There were no vitals taken for this visit.   healthy, alert and no distress  PSYCH: Alert and oriented times 3; coherent speech, normal   rate and volume, able to articulate logical thoughts, able   to abstract reason, no tangential thoughts, no hallucinations   or delusions  His affect is normal  RESP: No cough heard, no audible wheezing heard, able to talk in full sentences  Remainder of exam unable to be completed due to telephone visits    Diagnostic Test Results:  Labs reviewed in Epic  See orders        Assessment/Plan:  1. Cough    - benzonatate (TESSALON) 100 MG capsule; Take 1-2 capsules (100-200 mg) by mouth 3 times daily as needed for cough  Dispense: 42 capsule; Refill: 0    2. Cough present for greater than 3 weeks    - azithromycin (ZITHROMAX) 250 MG tablet; Take 2 tablets (500 mg) by mouth daily for 1 day, THEN 1 tablet (250 mg) daily for 4 days.  Dispense: 6 tablet; Refill: 0  - predniSONE (DELTASONE) 20 MG tablet; Take 1 tablet (20 mg) by mouth daily  Dispense: 5 tablet; Refill: 0    Return in about 1 week (around 4/20/2020), or if symptoms worsen or fail to improve.      Phone call duration:  8 minutes    Jane Dwyer NP

## 2020-04-13 NOTE — PATIENT INSTRUCTIONS

## 2020-04-28 VITALS — BODY MASS INDEX: 32.51 KG/M2 | WEIGHT: 240 LBS | HEIGHT: 72 IN

## 2020-04-28 RX ORDER — FEXOFENADINE HCL 180 MG/1
180 TABLET ORAL DAILY
COMMUNITY
End: 2021-10-11

## 2020-04-28 ASSESSMENT — MIFFLIN-ST. JEOR: SCORE: 1991.63

## 2020-04-28 NOTE — PATIENT INSTRUCTIONS
Your BMI is Body mass index is 32.55 kg/m .  Weight management is a personal decision.  If you are interested in exploring weight loss strategies, the following discussion covers the approaches that may be successful. Body mass index (BMI) is one way to tell whether you are at a healthy weight, overweight, or obese. It measures your weight in relation to your height.  A BMI of 18.5 to 24.9 is in the healthy range. A person with a BMI of 25 to 29.9 is considered overweight, and someone with a BMI of 30 or greater is considered obese. More than two-thirds of American adults are considered overweight or obese.  Being overweight or obese increases the risk for further weight gain. Excess weight may lead to heart disease and diabetes.  Creating and following plans for healthy eating and physical activity may help you improve your health.  Weight control is part of healthy lifestyle and includes exercise, emotional health, and healthy eating habits. Careful eating habits lifelong are the mainstay of weight control. Though there are significant health benefits from weight loss, long-term weight loss with diet alone may be very difficult to achieve- studies show long-term success with dietary management in less than 10% of people. Attaining a healthy weight may be especially difficult to achieve in those with severe obesity. In some cases, medications, devices and surgical management might be considered.  What can you do?  If you are overweight or obese and are interested in methods for weight loss, you should discuss this with your provider.     Consider reducing daily calorie intake by 500 calories.     Keep a food journal.     Avoiding skipping meals, consider cutting portions instead.    Diet combined with exercise helps maintain muscle while optimizing fat loss. Strength training is particularly important for building and maintaining muscle mass. Exercise helps reduce stress, increase energy, and improves fitness.  "Increasing exercise without diet control, however, may not burn enough calories to loose weight.       Start walking three days a week 10-20 minutes at a time    Work towards walking thirty minutes five days a week     Eventually, increase the speed of your walking for 1-2 minutes at time    In addition, we recommend that you review healthy lifestyles and methods for weight loss available through the National Institutes of Health patient information sites:  http://win.niddk.nih.gov/publications/index.htm    And look into health and wellness programs that may be available through your health insurance provider, employer, local community center, or [employer].    Weight management plan: Patient was referred to their PCP to discuss a diet and exercise plan.    MY TREATMENT INFORMATION FOR SLEEP APNEA-  Ata Garcia    DOCTOR : Efra Parish MD  SLEEP CENTER :      MY CONTACT NUMBER:     Am I having a sleep study at a sleep center?  Make sure you have an appointment for the study before you leave!    Am I having a home sleep study?  Watch this video:  /drop off device-   Https://[email or web address]/watch?v=yGGFBdELGhk  Disposable device sent out require phone/computer application-   https://[email or web address]/watch?v=BCce_vbiwxE  Please verify your insurance coverage with your insurance carrier    Frequently asked questions:  1. What is Obstructive Sleep Apnea (ARJIV)? RAJIV is the most common type of sleep apnea. Apnea means, \"without breath.\"  Apnea is most often caused by narrowing or collapse of the upper airway as muscles relax during sleep.   Almost everyone has occasional apneas. Most people with sleep apnea have had brief interruptions at night frequently for many years.  The severity of sleep apnea is related to how frequent and severe the events are.   2. What are the consequences of RAJIV? Symptoms include: feeling sleepy during the day, snoring loudly, gasping or stopping of breathing, " trouble sleeping, and occasionally morning headaches or heartburn at night.  Sleepiness can be serious and even increase the risk of falling asleep while driving. Other health consequences may include development of high blood pressure and other cardiovascular disease in persons who are susceptible. Untreated RAJIV  can contribute to heart disease, stroke and diabetes.   3. What are the treatment options? In most situations, sleep apnea is a lifelong disease that must be managed with daily therapy. Medications are not effective for sleep apnea and surgery is generally not considered until other therapies have been tried. Your treatment is your choice . Continuous Positive Airway (CPAP) works right away and is the therapy that is effective in nearly everyone. An oral device to hold your jaw forward is usually the next most reliable option. Other options include postioning devices (to keep you off your back), weight loss, and surgery including a tongue pacing device. There is more detail about some of these options below.    Important tips for using CPAP and similar devices   Know your equipment:  CPAP is continuous positive airway pressure that prevents obstructive sleep apnea by keeping the throat from collapsing while you are sleeping. In most cases, the device is  smart  and can slowly self-adjusts if your throat collapses and keeps a record every day of how well you are treated-this information is available to you and your care team.  BPAP is bilevel positive airway pressure that keeps your throat open and also assists each breath with a pressure boost to maintain adequate breathing.  Special kinds of BPAP are used in patients who have inadequate breathing from lung or heart disease. In most cases, the device is  smart  and can slowly self-adjusts to assist breathing. Like CPAP, the device keeps a record of how well you are treated.  Your mask is your connection to the device. You get to choose what feels most  comfortable and the staff will help to make sure if fits. Here: are some examples of the different masks that are available:       Key points to remember on your journey with sleep apnea:  1. Sleep study.  PAP devices often need to be adjusted during a sleep study to show that they are effective and adjusted right.  2. Good tips to remember: Try wearing just the mask during a quiet time during the day so your body adapts to wearing it. A humidifier is recommended for comfort in most cases to prevent drying of your nose and throat. Allergy medication from your provider may help you if you are having nasal congestion.  3. Getting settled-in. It takes more than one night for most of us to get used to wearing a mask. Try wearing just the mask during a quiet time during the day so your body adapts to wearing it. A humidifier is recommended for comfort in most cases. Our team will work with you carefully on the first day and will be in contact within 4 days and again at 2 and 4 weeks for advice and remote device adjustments. Your therapy is evaluated by the device each day.   4. Use it every night. The more you are able to sleep naturally for 7-8 hours, the more likely you will have good sleep and to prevent health risks or symptoms from sleep apnea. Even if you use it 4 hours it helps. Occasionally all of us are unable to use a medical therapy, in sleep apnea, it is not dangerous to miss one night.   5. Communicate. Call our skilled team on the number provided on the first day if your visit for problems that make it difficult to wear the device. Over 2 out of 3 patients can learn to wear the device long-term with help from our team. Remember to call our team or your sleep providers if you are unable to wear the device as we may have other solutions for those who cannot adapt to mask CPAP therapy. It is recommended that you sleep your sleep provider within the first 3 months and yearly after that if you are not having  problems.   6. Use it for your health. We encourage use of CPAP masks during daytime quiet periods to allow your face and brain to adapt to the sensation of CPAP so that it will be a more natural sensation to awaken to at night or during naps. This can be very useful during the first few weeks or months of adapting to CPAP though it does not help medically to wear CPAP during wakefulness and  should not be used as a strategy just to meet guidelines.  7. Take care of your equipment. Make sure you clean your mask and tubing using directions every day and that your filter and mask are replaced as recommended or if they are not working.     BESIDES CPAP, WHAT OTHER THERAPIES ARE THERE?    Positioning Device  Positioning devices are generally used when sleep apnea is mild and only occurs on your back.This example shows a pillow that straps around the waist. It may be appropriate for those whose sleep study shows milder sleep apnea that occurs primarily when lying flat on one's back. Preliminary studies have shown benefit but effectiveness at home may need to be verified by a home sleep test. These devices are generally not covered by medical insurance.  Examples of devices that maintain sleeping on the back to prevent snoring and mild sleep apnea.    Belt type body positioner  Http://stylemarks/    Electronic reminder  Http://nightshifttherapy.com/  Http://www.[employer].com.au/      Oral Appliance  What is oral appliance therapy?  An oral appliance device fits on your teeth at night like a retainer used after having braces. The device is made by a specialized dentist and requires several visits over 1-2 months before a manufactured device is made to fit your teeth and is adjusted to prevent your sleep apnea. Once an oral device is working properly, snoring should be improved. A home sleep test may be recommended at that time if to determine whether the sleep apnea is adequately treated.       Some things to  remember:  -Oral devices are often, but not always, covered by your medical insurance. Be sure to check with your insurance provider.   -If you are referred for oral therapy, you will be given a list of specialized dentists to consider or you may choose to visit the Web site of the American Academy of Dental Sleep Medicine  -Oral devices are less likely to work if you have severe sleep apnea or are extremely overweight.     More detailed information  An oral appliance is a small acrylic device that fits over the upper and lower teeth  (similar to a retainer or a mouth guard). This device slightly moves jaw forward, which moves the base of the tongue forward, opens the airway, improves breathing for effective treat snoring and obstructive sleep apnea in perhaps 7 out of 10 people .  The best working devices are custom-made by a dental device  after a mold is made of the teeth 1, 2, 3.  When is an oral appliance indicated?  Oral appliance therapy is recommended as a first-line treatment for patients with primary snoring, mild sleep apnea, and for patients with moderate sleep apnea who prefer appliance therapy to use of CPAP4, 5. Severity of sleep apnea is determined by sleep testing and is based on the number of respiratory events per hour of sleep.   How successful is oral appliance therapy?  The success rate of oral appliance therapy in patients with mild sleep apnea is 75-80% while in patients with moderate sleep apnea it is 50-70%. The chance of success in patients with severe sleep apnea is 40-50%. The research also shows that oral appliances have a beneficial effect on the cardiovascular health of RAJIV patients at the same magnitude as CPAP therapy7.  Oral appliances should be a second-line treatment in cases of severe sleep apnea, but if not completely successful then a combination therapy utilizing CPAP plus oral appliance therapy may be effective. Oral appliances tend to be effective in a broad  range of patients although studies show that the patients who have the highest success are females, younger patients, those with milder disease, and less severe obesity. 3, 6.   Finding a dentist that practices dental sleep medicine  Specific training is available through the American Academy of Dental Sleep Medicine for dentists interested in working in the field of sleep. To find a dentist who is educated in the field of sleep and the use of oral appliances, near you, visit the Web site of the American Academy of Dental Sleep Medicine.    References  1. Tan et al. Objectively measured vs self-reported compliance during oral appliance therapy for sleep-disordered breathing. Chest 2013; 144(5): 3549-7040.  2. Allison, et al. Objective measurement of compliance during oral appliance therapy for sleep-disordered breathing. Thorax 2013; 68(1): 91-96.  3. Alexander et al. Mandibular advancement devices in 620 men and women with RAJIV and snoring: tolerability and predictors of treatment success. Chest 2004; 125: 7302-3226.  4. Brandon, et al. Oral appliances for snoring and RAJIV: a review. Sleep 2006; 29: 244-262.  5. Charis et al. Oral appliance treatment for RAJIV: an update. J Clin Sleep Med 2014; 10(2): 215-227.  6. Nicola et al. Predictors of OSAH treatment outcome. J Dent Res 2007; 86: 7969-2648.      Weight Loss:    Weight loss is a long-term strategy that may improve sleep apnea in some patients.    Weight management is a personal decision and the decision should be based on your interest and the potential benefits.  If you are interested in exploring weight loss strategies, the following discussion covers the impact on weight loss on sleep apnea and the approaches that may be successful.    Being overweight does not necessarily mean you will have health consequences.  Those who have BMI over 35 or over 27 with existing medical conditions carries greater risk.   Weight loss decreases severity of  sleep apnea in most people with obesity. For those with mild obesity who have developed snoring with weight gain, even 15-30 pound weight loss can improve and occasionally eliminate sleep apnea.  Structured and life-long dietary and health habits are necessary to lose weight and keep healthier weight levels.     Though there may be significant health benefits from weight loss, long-term weight loss is very difficult to achieve- studies show success with dietary management in less than 10% of people. In addition, substantial weight loss may require years of dietary control and may be difficult if patients have severe obesity. In these cases, surgical management may be considered.  Finally, older individuals who have tolerated obesity without health complications may be less likely to benefit from weight loss strategies.        Your BMI is Body mass index is 32.55 kg/m .  Weight management is a personal decision.  If you are interested in exploring weight loss strategies, the following discussion covers the approaches that may be successful. Body mass index (BMI) is one way to tell whether you are at a healthy weight, overweight, or obese. It measures your weight in relation to your height.  A BMI of 18.5 to 24.9 is in the healthy range. A person with a BMI of 25 to 29.9 is considered overweight, and someone with a BMI of 30 or greater is considered obese. More than two-thirds of American adults are considered overweight or obese.  Being overweight or obese increases the risk for further weight gain. Excess weight may lead to heart disease and diabetes.  Creating and following plans for healthy eating and physical activity may help you improve your health.  Weight control is part of healthy lifestyle and includes exercise, emotional health, and healthy eating habits. Careful eating habits lifelong are the mainstay of weight control. Though there are significant health benefits from weight loss, long-term weight loss  with diet alone may be very difficult to achieve- studies show long-term success with dietary management in less than 10% of people. Attaining a healthy weight may be especially difficult to achieve in those with severe obesity. In some cases, medications, devices and surgical management might be considered.  What can you do?  If you are overweight or obese and are interested in methods for weight loss, you should discuss this with your provider.     Consider reducing daily calorie intake by 500 calories.     Keep a food journal.     Avoiding skipping meals, consider cutting portions instead.    Diet combined with exercise helps maintain muscle while optimizing fat loss. Strength training is particularly important for building and maintaining muscle mass. Exercise helps reduce stress, increase energy, and improves fitness. Increasing exercise without diet control, however, may not burn enough calories to loose weight.       Start walking three days a week 10-20 minutes at a time    Work towards walking thirty minutes five days a week     Eventually, increase the speed of your walking for 1-2 minutes at time    In addition, we recommend that you review healthy lifestyles and methods for weight loss available through the National Institutes of Health patient information sites:  http://win.niddk.nih.gov/publications/index.htm    And look into health and wellness programs that may be available through your health insurance provider, employer, local community center, or [employer].      Surgery:    Surgery for obstructive sleep apnea is considered generally only when other therapies fail to work. Surgery may be discussed with you if you are having a difficult time tolerating CPAP and or when there is an abnormal structure that requires surgical correction.  Nose and throat surgeries often enlarge the airway to prevent collapse.  Most of these surgeries create pain for 1-2 weeks and up to half of the most common surgeries  are not effective throughout life.  You should carefully discuss the benefits and drawbacks to surgery with your sleep provider and surgeon to determine if it is the best solution for you.   More information  Surgery for RAJIV is directed at areas that are responsible for narrowing or complete obstruction of the airway during sleep.  There are a wide range of procedures available to enlarge and/or stabilize the airway to prevent blockage of breathing in the three major areas where it can occur: the palate, tongue, and nasal regions.  Successful surgical treatment depends on the accurate identification of the factors responsible for obstructive sleep apnea in each person.  A personalized approach is required because there is no single treatment that works well for everyone.  Because of anatomic variation, consultation with an examination by a sleep surgeon is a critical first step in determining what surgical options are best for each patient.  In some cases, examination during sedation may be recommended in order to guide the selection of procedures.  Patients will be counseled about risks and benefits as well as the typical recovery course after surgery. Surgery is typically not a cure for a person s RAJIV.  However, surgery will often significantly improve one s RAJIV severity (termed  success rate ).  Even in the absence of a cure, surgery will decrease the cardiovascular risk associated with OSA7; improve overall quality of life8 (sleepiness, functionality, sleep quality, etc).      Palate Procedures:  Patients with RAJIV often have narrowing of their airway in the region of their tonsils and uvula.  The goals of palate procedures are to widen the airway in this region as well as to help the tissues resist collapse.  Modern palate procedure techniques focus on tissue conservation and soft tissue rearrangement, rather than tissue removal.  Often the uvula is preserved in this procedure. Residual sleep apnea is common in  patient after pharyngoplasty with an average reduction in sleep apnea events of 33%2.      Tongue Procedures:  ExamWhile patients are awake, the muscles that surround the throat are active and keep this region open for breathing. These muscles relax during sleep, allowing the tongue and other structures to collapse and block breathing.  There are several different tongue procedures available.  Selection of a tongue base procedure depends on characteristics seen on physical exam.  Generally, procedures are aimed at removing bulky tissues in this area or preventing the back of the tongue from falling back during sleep.  Success rates for tongue surgery range from 50-62%3.    Hypoglossal Nerve Stimulation:  Hypoglossal nerve stimulation has recently received approval from the [place] Food and Drug Administration for the treatment of obstructive sleep apnea.  This is based on research showing that the system was safe and effective in treating sleep apnea6.  Results showed that the median AHI score decreased 68%, from 29.3 to 9.0. This therapy uses an implant system that senses breathing patterns and delivers mild stimulation to airway muscles, which keeps the airway open during sleep.  The system consists of three fully implanted components: a small generator (similar in size to a pacemaker), a breathing sensor, and a stimulation lead.  Using a small handheld remote, a patient turns the therapy on before bed and off upon awakening.    Candidates for this device must be greater than 22 years of age, have moderate to severe RAJIV (AHI between 20-65), BMI less than 32, have tried CPAP/oral appliance without success, and have appropriate upper airway anatomy (determined by a sleep endoscopy performed by Dr. Rivera).    Hypoglossal Nerve Stimulation Pathway:    The sleep surgeon s office will work with the patient through the insurance prior-authorization process (including communications and appeals).    Nasal  Procedures:  Nasal obstruction can interfere with nasal breathing during the day and night.  Studies have shown that relief of nasal obstruction can improve the ability of some patients to tolerate positive airway pressure therapy for obstructive sleep apnea1.  Treatment options include medications such as nasal saline, topical corticosteroid and antihistamine sprays, and oral medications such as antihistamines or decongestants. Non-surgical treatments can include external nasal dilators for selected patients. If these are not successful by themselves, surgery can improve the nasal airway either alone or in combination with these other options.      Combination Procedures:  Combination of surgical procedures and other treatments may be recommended, particularly if patients have more than one area of narrowing or persistent positional disease.  The success rate of combination surgery ranges from 66-80%2,3.    References  1. Jacinda GONZALEZ. The Role of the Nose in Snoring and Obstructive Sleep Apnoea: An Update.  Eur Arch Otorhinolaryngol. 2011; 268: 1365-73.  2.  Christina SM; Gretta JA; Liam JR; Pallanch JF; Stefani MB; Wilver SG; Amy WILLOUGHBY. Surgical modifications of the upper airway for obstructive sleep apnea in adults: a systematic review and meta-analysis. SLEEP 2010;33(10):5113-6807. Daveripenny MICHAELS. Hypopharyngeal surgery in obstructive sleep apnea: an evidence-based medicine review.  Arch Otolaryngol Head Neck Surg. 2006 Feb;132(2):206-13.  3. Richard YH1, Imelda Y, Sarkis HUANG. The efficacy of anatomically based multilevel surgery for obstructive sleep apnea. Otolaryngol Head Neck Surg. 2003 Oct;129(4):327-35.  4. Kayleen MICHAELS, Goldberg A. Hypopharyngeal Surgery in Obstructive Sleep Apnea: An Evidence-Based Medicine Review. Arch Otolaryngol Head Neck Surg. 2006 Feb;132(2):206-13.  5. Tari THEODORE et al. Upper-Airway Stimulation for Obstructive Sleep Apnea.  N Engl J Med. 2014 Jan 9;370(2):139-49.  6. Naya JENKINS et al. Increased  Incidence of Cardiovascular Disease in Middle-aged Men with Obstructive Sleep Apnea. Am J Respir Crit Care Med; 2002 166: 159-165  7. Jennifer KHAN et al. Studying Life Effects and Effectiveness of Palatopharyngoplasty (SLEEP) study: Subjective Outcomes of Isolated Uvulopalatopharyngoplasty. Otolaryngol Head Neck Surg. 2011; 144: 623-631.    Please  avoid driving, operating any heavy machinery or other hazardous situations while drowsy or sleepy.   Please follow-up with your PCP for evaluation of the chronic cough.

## 2020-04-28 NOTE — PROGRESS NOTES
"Ata Garcia is a 49 year old male who is being evaluated via a billable video visit.      The patient has been notified of following:     \"This video visit will be conducted via a call between you and your physician/provider. We have found that certain health care needs can be provided without the need for an in-person physical exam.  This service lets us provide the care you need with a video conversation.  If a prescription is necessary we can send it directly to your pharmacy.  If lab work is needed we can place an order for that and you can then stop by our lab to have the test done at a later time.    Video visits are billed at different rates depending on your insurance coverage.  Please reach out to your insurance provider with any questions.    If during the course of the call the physician/provider feels a video visit is not appropriate, you will not be charged for this service.\"    Patient has given verbal consent for Video visit? Yes    How would you like to obtain your AVS? Shmuel    Patient would like the video invitation sent by: Send to e-mail at: [email or web address]    Will anyone else be joining your video visit? No      Sleep Consultation Note:    Date on this visit: 4/29/2020      Ata Garcia is sent by No ref. provider found for a sleep consultation regarding.  He is using evaluation for sleep apnea.    Primary Physician: No Ref-Primary, Physician     Chief complaint: Loud Snoring and waking up several times during the night. Snoring has been worse in the last 6 months.    Ata Garcia 49 year old with PMH of obesity, RAJIV, hyperlipidemia who reports loud snoring and waking up several times during the night     He had a previous sleep study in April 2000, reports of which are currently unavailable. According to the patient, study showed moderate degree of sleep apnea with 19.2 apneas per hour of sleep. He was prescribed CPAP which he used for approximately a month and discontinued the " treatment and underwent upper airway surgery(adenotonsillectomy, uvulectomy and surgical repair for deviated nasal septum). Following surgery, snoring improved but  with weight gain in the  last couple of years, the snoring recurred and has been worse in the last 6 months. He denies  snort arousals. He reports occasionally waking up feeling panic gasping for air, but denies choking. He reports dry mouth and  morning headaches.     He does not complain of difficulty with falling asleep.   During work days, Ata goes to bed at 930 PM and wakes up at 530 AM.  It usually takes 15 minutes to fall asleep.  On non-work days, He falls asleep at 1030PM and gets up 730AM.  He wakes up 2-4 times throughout the night.  Night time awakenings occurs due to snoring, stopping breathing, noises.  After awakening, He is able to fall back asleep after 20 minutes.  He reports non-refreshing sleep and occasional fatigue. He denies daytime sleepiness.    He reports occasional  drowsiness while driving long distances, but denies any concerns with regular local commutes.    Ata does not nap during the day.    Patient does not use electronics in bed.    Sleep scales:  Patient's [place] Sleepiness score 8/24   Insomnia severity index:15      Restless Legs symptoms  Ata does not complain of restlessness feelings in the legs.    Sleep Behaviors  He denies any cataplexy, sleep paralysis, sleep hallucinations.  He denies any night time behaviors - sleep walking, sleep talking, sleep eating.  He does not complain acting out dreams.      Social History  Ata is . He lives with his wife and three children ( ages 24,18 and 18)  He does drink caffeine, a can of pop or flavored pack of caffeine with water once/day. Last caffeine intake is not within 6 hours of bed time.  He drinks alcohol, occasionally on weekends.  Does  not use alcohol as a sleep aid.  Patient is a never smoker.   Patient does not use drugs.  No future surgeries  planned.    Allergies:    No Known Allergies    Medications:    Current Outpatient Medications   Medication Sig Dispense Refill     fexofenadine (ALLEGRA) 180 MG tablet Take 180 mg by mouth daily       sildenafil (VIAGRA) 100 MG tablet 1/2 tab to 1 tab 30 minutes to 4 hours before sexual activity 20 tablet 3       Problem List:  Patient Active Problem List    Diagnosis Date Noted     Obesity, Class I, BMI 30-34.9 09/13/2016     Priority: Medium     Ocular hypertension 10/24/2011     Priority: Medium     Target IOP:    Peak IOP: 30  GDX: prob nl  HVF:    Pachymetry: thick  C/D: 0.40.4  SLT:           CARDIOVASCULAR SCREENING; LDL GOAL LESS THAN 160 10/31/2010     Priority: Medium        Past Medical/Surgical History:  Past Medical History:   Diagnosis Date     Hypercholesterolemia      RAJIV (obstructive sleep apnea)     treated surgically     Prostatitis      Past Surgical History:   Procedure Laterality Date     COLONOSCOPY WITH CO2 INSUFFLATION N/A 10/5/2016    Procedure: COLONOSCOPY WITH CO2 INSUFFLATION;  Surgeon: Glenn Joiner DO;  Location: [facility] OR     HC REMOVAL GALLBLADDER  10/2004     TONSILLECTOMY & ADENOIDECTOMY  2001    for RAJIV     UVULECTOMY  2001    for RAJIV     VASECTOMY  2002       Social History:  Social History     Socioeconomic History     Marital status:      Spouse name: Not on file     Number of children: 3     Years of education: Not on file     Highest education level: Not on file   Occupational History     Not on file   Social Needs     Financial resource strain: Not on file     Food insecurity     Worry: Not on file     Inability: Not on file     Transportation needs     Medical: Not on file     Non-medical: Not on file   Tobacco Use     Smoking status: Never Smoker     Smokeless tobacco: Never Used   Substance and Sexual Activity     Alcohol use: Yes     Comment: occasionally     Drug use: No     Sexual activity: Yes     Partners: Female   Lifestyle     Physical activity     Days per  week: Not on file     Minutes per session: Not on file     Stress: Not on file   Relationships     Social connections     Talks on phone: Not on file     Gets together: Not on file     Attends [employer] service: Not on file     Active member of club or organization: Not on file     Attends meetings of clubs or organizations: Not on file     Relationship status: Not on file     Intimate partner violence     Fear of current or ex partner: Not on file     Emotionally abused: Not on file     Physically abused: Not on file     Forced sexual activity: Not on file   Other Topics Concern     Parent/sibling w/ CABG, MI or angioplasty before 65F 55M? Not Asked   Social History Narrative     Not on file       Family History:  Family history pertinent to sleep disorders: dad rolo  Family History   Problem Relation Age of Onset     Lymphoma Father         non-H     Diabetes Father         type 2     Breast Cancer Sister      Diabetes Maternal Grandfather         type 1     Glaucoma No family hx of      Macular Degeneration No family hx of      Hypertension No family hx of      Cerebrovascular Disease No family hx of      Thyroid Disease No family hx of      Coronary Artery Disease No family hx of        Review of Systems:  A complete review of systems reviewed by me is negative with the exeption of what has been mentioned in the history of present illness,  and symptoms listed below, highlighted in red:  CONSTITUTIONAL: weight gain; NEGATIVE for fever, chills, sweats or night sweats, drug allergies.  EYES: NEGATIVE for changes in vision, blind spots, double vision.  ENT:sore throat;  NEGATIVE for ear pain, sinus pain, post-nasal drip, runny nose, bloody nose  CARDIAC: NEGATIVE for fast heartbeats or fluttering in chest, chest pain or pressure, breathlessness when lying flat, swollen legs or swollen feet.  NEUROLOGIC:  headaches, NEGATIVE weakness or numbness in the arms or legs.  DERMATOLOGIC: NEGATIVE for rashes, new moles or  change in mole(s)  PULMONARY:dry cough, productive cough, which has been going on for the past 3 months since the last part of January 2020.  He recently finished course of antibiotic(azithromycin) and steroids. NEGATIVE SOB at rest, SOB with activity,  coughing up blood, wheezing or whistling when breathing.    GASTROINTESTINAL: NEGATIVE for nausea or vomitting, loose or watery stools, fat or grease in stools, constipation, abdominal pain, bowel movements black in color or blood noted.  GENITOURINARY: NEGATIVE for pain during urination, blood in urine, urinating more frequently than usual  MUSCULOSKELETAL:  joint pain-knees and ankles hurt  ENDOCRINE: NEGATIVE for increased thirst or urination, diabetes.  LYMPHATIC: NEGATIVE for swollen lymph nodes, lumps or bumps in the breasts or nipple discharge.  PSYCHE: NEGATIVE for depression, anxiety    Physical Examination:  Vitals: Ht 1.829 m (6')   Wt 108.9 kg (240 lb)   BMI 32.55 kg/m    BMI= Body mass index is 32.55 kg/m .     [place] Total Score 4/28/2020   Total score - [place] 8     General: No apparent distress, appropriately groomed  Head: Normocephalic, atraumatic  PSYCH: Alert and oriented times 3; coherent speech, normal   rate and volume, able to articulate logical thoughts, able   to abstract reason, no tangential thoughts, no hallucinations   or delusions  His affect is  Normal   RESP: No cough, no audible wheezing, able to talk in full sentences  Remainder of exam unable to be completed due to video visit      Impression/Plan:  Snoring, observed apneas, non restorative sleep, fatigue. Possible Obstructive sleep apnea  (h/o previously diagnosed RAJIV-he underwent upper airway surgery in past but with significant weight gain reports recurrence of symptoms of RAJIV)  STOP BANG score is 5/8.   Recommend HST to evaluate for RAJIV and if the HST is negative will consider obtaining supervised polysomnography.    Patient was informed that currently because of the Covid  "situation we have not been conducting any sleep tests at our center but as soon as we start the tests our clinic staff will contact him to schedule the HST.  HST and Polysomnography reviewed.   Obstructive sleep apnea reviewed.  Complications of untreated sleep apnea were reviewed .   Treatment for RAJIV have been discussed.     Obesity: We discussed weight management with healthy diet, and exercise.     Chronic cough-dry and  productive cough, which has been going on for the past 3 months since the last part of January 2020.  He recently finished course of antibiotic(azithromycin) and steroids and still reports cough. Denies shortness of breath/wheezing and was recommended  to f/u with PCP for further evaluation.    History of hyperlipidemia(per electronic medical record): but patient is not aware of this and is not on any lipid-lowering agent. Recommended annual f/u with PCP.    Encouraged to follow good sleep hygiene/behavioral techniques.    Patient is a life long non-smoker and has been encouraged to continue to not smoke.  Patient was strongly advised to avoid driving, operating any heavy machinery or other hazardous situations while drowsy or sleepy.  Patient was counseled on the importance of driving while alert, to pull over if drowsy, or nap before getting into the vehicle if sleepy.      Plan is to communicate the results of the home sleep study with patient in 10-14 days after test is completed.      CC: No ref. provider found      The above note was dictated using voice recognition software. Although reviewed after completion, some word and grammatical error may remain . Please contact the author for any clarifications.    \"I spent a total of 32 minutes  with Ata Garcia during today's video visit. Over 50% of this time was spent counseling the patient and  coordinating care regarding  RAJIV, HST/PSG, consequences of untreated sleep apnea and treatment options,chr.cough, weight management and chart " "review.\"       Video-Visit Details    Type of service:  Video Visit    Video Start Time: 10:09 AM  Video End Time: 10:41 AM    Originating Location (pt. Location): Home    Distant Location (provider location):  [facility]     Mode of Communication:  Video Conference via MD JUAN RAMON [facility]  [id number] [address] [id number] [phone]  Dept: [phone]                                  "

## 2020-04-29 ENCOUNTER — VIRTUAL VISIT (OUTPATIENT)
Dept: SLEEP MEDICINE | Facility: CLINIC | Age: 50
End: 2020-04-29
Payer: COMMERCIAL

## 2020-04-29 DIAGNOSIS — G47.33 OSA (OBSTRUCTIVE SLEEP APNEA): Primary | ICD-10-CM

## 2020-04-29 PROCEDURE — 99204 OFFICE O/P NEW MOD 45 MIN: CPT | Mod: GT | Performed by: INTERNAL MEDICINE

## 2020-06-26 DIAGNOSIS — N52.9 ERECTILE DYSFUNCTION, UNSPECIFIED ERECTILE DYSFUNCTION TYPE: ICD-10-CM

## 2020-06-30 NOTE — TELEPHONE ENCOUNTER
This writer attempted to contact patient on 06/30/20      Reason for call due for visit and left detailed message.      If patient calls back:   1st floor [place] Care Team (MA/TC) called. Inform patient that someone from the team will contact them, document that pt called and route to care team.         Rosita Dockery MA

## 2020-06-30 NOTE — TELEPHONE ENCOUNTER
Team, please contact patient and inform needs annual check up for medication refill.  Has not been seen since 12/26/18, needs to be seen at least yearly for prescription refills.  Route encounter back to refill pool when complete.    Laina Zimmer RN  [facility]

## 2020-07-01 NOTE — TELEPHONE ENCOUNTER
This writer attempted to contact patient on 07/01/20      Reason for call appointment needed and left message.      If patient calls back:   1st floor [place] Care Team (MA/TC) called. Inform patient that someone from the team will contact them, document that pt called and route to care team.         Rosita Dockery MA

## 2020-07-02 RX ORDER — SILDENAFIL 100 MG/1
TABLET, FILM COATED ORAL
Qty: 20 TABLET | Refills: 3 | Status: SHIPPED | OUTPATIENT
Start: 2020-07-02 | End: 2021-10-11

## 2020-07-02 NOTE — TELEPHONE ENCOUNTER
Rosita Dockery, MA  [facility] 3 Rn Pool 1 hour ago (10:43 AM)      3 attempts, sent letter via my chart. Please advise?    Routing comment     Routing to provider to review and advise on refill request.  Attempts have been made to contact patient, with no return call or scheduled visit.    Laina Zimmer RN  [facility]

## 2020-07-02 NOTE — TELEPHONE ENCOUNTER
This writer attempted to contact patient on 07/02/20      Reason for call need visit and left detailed message.      If patient calls back:   1st floor [place] Care Team (MA/TC) called. Inform patient that someone from the team will contact them, document that pt called and route to care team.       Letter sent via [facility].    Rosita Dockery MA

## 2020-07-09 ENCOUNTER — OFFICE VISIT (OUTPATIENT)
Dept: SLEEP MEDICINE | Facility: CLINIC | Age: 50
End: 2020-07-09
Payer: COMMERCIAL

## 2020-07-09 ENCOUNTER — DOCUMENTATION ONLY (OUTPATIENT)
Dept: SLEEP MEDICINE | Facility: CLINIC | Age: 50
End: 2020-07-09

## 2020-07-09 DIAGNOSIS — G47.33 OSA (OBSTRUCTIVE SLEEP APNEA): ICD-10-CM

## 2020-07-09 PROCEDURE — G0399 HOME SLEEP TEST/TYPE 3 PORTA: HCPCS | Performed by: INTERNAL MEDICINE

## 2020-07-09 NOTE — Clinical Note
Jorge Malik, HST done, Thanks, james Michel schedule follow up visit with me tomorrow morning (7/21/2020) to review HST results. Thanks,Lesa

## 2020-07-10 ENCOUNTER — DOCUMENTATION ONLY (OUTPATIENT)
Dept: SLEEP MEDICINE | Facility: CLINIC | Age: 50
End: 2020-07-10
Payer: COMMERCIAL

## 2020-07-10 NOTE — PROGRESS NOTES
HST POST-STUDY QUESTIONNAIRE    1. What time did you go to bed?  10:25 pm  2. How long do you think it took to fall asleep?  15 minutes  3. What time did you wake up to start the day?  5;25 AM  4. Did you get up during the night at all?  YES  5. If you woke up, do you remember approximately what time(s)? 11:50, 1:57 and 5:00 am.  6. Did you have any difficulty with the equipment?  No  7. Did you us any type of treatment with this study?  None  8. Was the head of the bed elevated? No  9. Did you sleep in a recliner?  No  10. Did you stop using CPAP at least 3 days before this test?  NA  11. Any other information you'd like us to know? n/a

## 2020-07-10 NOTE — PROGRESS NOTES
This HSAT was performed using a Noxturnal T3 device which recorded snore, sound, movement activity, body position, nasal pressure, oronasal thermal airflow, pulse, oximetry and both chest and abdominal respiratory effort. HSAT data was restricted to the time patient states they were in bed.     HSAT was scored using 1B 4% hypopnea rule.     HST AHI (Non-PAT): 48  Snoring was reported as loud.  Time with SpO2 below 89% was 29.5 minutes.   Overall signal quality was good     Pt will follow up with sleep provider to determine appropriate therapy.

## 2020-07-20 NOTE — PROCEDURES
HOME SLEEP STUDY INTERPRETATION    Patient: Ata Garcia  MRN: [id number]  YOB: 1970  Study Date: 2020  Ordering Provider:  Efra Parish MD      Indications for Home Study: Ata Garcia is a 50 year old male with a history of obesity, RAJIV, hyperlipidemia who reports loud snoring and waking up several times during the night . He was  previously diagnosed with RAJIV-he underwent upper airway surgery in past but with significant weight gain reports recurrence of symptoms of RAJIV.Home sleep study was obtained to reevaluate for obstructive sleep apnea.    Estimated body mass index is 32.55 kg/m  as calculated from the following:    Height as of 20: 1.829 m (6').    Weight as of 20: 108.9 kg (240 lb).  [place] Sleepiness Scale:   STOP-BAN/8    Data: A full night home sleep study was performed recording the standard physiologic parameters including body position, movement, sound, nasal pressure, thermal oral airflow, chest and abdominal movements with respiratory inductance plethysmography, and oxygen saturation by pulse oximetry. Pulse rate was estimated by oximetry recording. This study was considered adequate based on > 4 hours of quality oximetry and respiratory recording. As specified by the AASM Manual for the Scoring of Sleep and Associated events, version 2.3, Rule VIII.D 1B, 4% oxygen desaturation scoring for hypopneas is used as a standard of care on all home sleep apnea testing.    Analysis Time: 421.6 minutes    Respiration:   Sleep Associated Hypoxemia: sustained hypoxemia was present. Baseline oxygen saturation was 92.3%.  Time with saturation less than or equal to 88% was 29.5  minutes. The lowest oxygen saturation was 81%.   Snoring: Snoring was present.  Respiratory events: The home study revealed a presence of 184 obstructive apneas and 14 mixed and central apneas. There were 139 hypopneas resulting in a combined apnea/hypopnea index [AHI]  of 48 events per hour.  AHI was 82.9 per hour supine, n/a per hour prone, 25.5 per hour on left side, and 41.3 per hour on right side.   Pattern: Excluding events noted above, respiratory rate and pattern was Normal.    Position: Percent of time spent: supine -22.8 %, prone -0 %, on left -11.7 %, on right -63 %.    Heart Rate: By pulse oximetry , the average rate was normal at 69 bpm with occasional tachycardia.    Assessment:   Severe obstructive sleep apnea, during supine and non supine sleep, but pronounced during supine position..  Sleep associated hypoxemia was present.    Recommendations:  Consider auto titrating CPAP with pressure settings 5-15 cmH2O or combination of oral appliance with positional therapy during sleep to control the sleep-related breathing disorder  Suggest optimizing sleep hygiene and avoiding sleep deprivation.  Weight management.    Diagnosis Code(s): Obstructive Sleep Apnea G47.33, Hypoxemia G47.36, Snoring R06.83    Efra Parish MD, July 20, 2020   Diplomate, American Board of Internal Medicine, Sleep Medicine

## 2020-07-21 ENCOUNTER — VIRTUAL VISIT (OUTPATIENT)
Dept: SLEEP MEDICINE | Facility: CLINIC | Age: 50
End: 2020-07-21
Payer: COMMERCIAL

## 2020-07-21 ENCOUNTER — TELEPHONE (OUTPATIENT)
Dept: SLEEP MEDICINE | Facility: CLINIC | Age: 50
End: 2020-07-21

## 2020-07-21 VITALS — BODY MASS INDEX: 32.1 KG/M2 | HEIGHT: 72 IN | WEIGHT: 237 LBS

## 2020-07-21 DIAGNOSIS — G47.33 OSA (OBSTRUCTIVE SLEEP APNEA): Primary | ICD-10-CM

## 2020-07-21 PROCEDURE — 99214 OFFICE O/P EST MOD 30 MIN: CPT | Mod: 95 | Performed by: INTERNAL MEDICINE

## 2020-07-21 ASSESSMENT — MIFFLIN-ST. JEOR: SCORE: 1973.02

## 2020-07-21 NOTE — PROGRESS NOTES
"Ata Garcia is a 50 year old male who is being evaluated via a billable video visit.      The patient has been notified of following:     \"This video visit will be conducted via a call between you and your physician/provider. We have found that certain health care needs can be provided without the need for an in-person physical exam.  This service lets us provide the care you need with a video conversation.  If a prescription is necessary we can send it directly to your pharmacy.  If lab work is needed we can place an order for that and you can then stop by our lab to have the test done at a later time.    Video visits are billed at different rates depending on your insurance coverage.  Please reach out to your insurance provider with any questions.    If during the course of the call the physician/provider feels a video visit is not appropriate, you will not be charged for this service.\"    Patient has given verbal consent for Video visit? Yes  How would you like to obtain your AVS? MyChart  If you are dropped from the video visit, the video invite should be resent to: Send to e-mail at: [email or web address]  Will anyone else be joining your video visit? No        Video-Visit Details    Type of service:  Video Visit    Video Start Time: 3:50PM  Video End Time: 4:06PM    Originating Location (pt. Location): Home    Distant Location (provider location):  [facility]     Platform used for Video Visit: Priscila Parish MD  [facility]   Floor 1, [address]   [address] [place], [place] [address]. [place] [address]   Appointments: [phone]    SLEEP CLINIC FOLLOW UP VISIT     Date of visit: 7/21/2020    Purpose of visit: F/u results of recent home sleep study     HPI: Ata Garcia is a 50 year old male with history of obesity, RAJIV, hyperlipidemia who reports loud snoring and waking up several times during the night . " He was  previously diagnosed with RAJIV-he underwent upper airway surgery in past but with significant weight gain reports recurrence of symptoms of RAJIV. Home sleep study was obtained on 7/9/2020 to reevaluate for obstructive sleep apnea. Virtual viist scheduled today to review results of recent home sleep study.     Home sleep study results     Date of study:7/9/2020    Analysis Time: 421.6 minutes     Respiration:   Sleep Associated Hypoxemia: sustained hypoxemia was present. Baseline oxygen saturation was 92.3%.  Time with saturation less than or equal to 88% was 29.5  minutes. The lowest oxygen saturation was 81%.   Snoring: Snoring was present.  Respiratory events: The home study revealed a presence of 184 obstructive apneas and 14 mixed and central apneas. There were 139 hypopneas resulting in a combined apnea/hypopnea index [AHI] of 48 events per hour.  AHI was 82.9 per hour supine, n/a per hour prone, 25.5 per hour on left side, and 41.3 per hour on right side.   Pattern: Excluding events noted above, respiratory rate and pattern was Normal.     Position: Percent of time spent: supine -22.8 %, prone -0 %, on left -11.7 %, on right -63 %.     Heart Rate: By pulse oximetry , the average rate was normal at 69 bpm with occasional tachycardia.     Assessment:   Severe obstructive sleep apnea, during supine and non supine sleep, but pronounced during supine position..  Sleep associated hypoxemia was present.     Test results were discussed with patient in detail.    Current meds:  Current Outpatient Medications   Medication Sig Dispense Refill     benzonatate (TESSALON) 100 MG capsule Take 1-2 capsules (100-200 mg) by mouth 3 times daily as needed for cough (Patient not taking: Reported on 4/28/2020) 42 capsule 0     fexofenadine (ALLEGRA) 180 MG tablet Take 180 mg by mouth daily       predniSONE (DELTASONE) 20 MG tablet Take 1 tablet (20 mg) by mouth daily 5 tablet 0     sildenafil (VIAGRA) 100 MG tablet TAKE  ONE-HALF TO ONE TABLET BY MOUTH 30 MINUTES TO 4 HOURS BEFORE SEXUAL ACTIVITY 20 tablet 3     Problem list:  Patient Active Problem List   Diagnosis     CARDIOVASCULAR SCREENING; LDL GOAL LESS THAN 160     Ocular hypertension     Obesity, Class I, BMI 30-34.9       Past medical history, Past surgical history, Allergies, Social history, Family history: reviewed, per EMR    Exam:  Ht 1.829 m (6')   Wt 107.5 kg (237 lb)   BMI 32.14 kg/m    GENERAL APPEARANCE:in no distress  PSYCH: Alert and oriented times 3; coherent speech, normal   rate and volume, able to articulate logical thoughts, able   to abstract reason, no tangential thoughts, no hallucinations   or delusions  His affect is  normal  RESP: No cough, no audible wheezing, able to talk in full sentences     Assessment/Plan:   Severe obstructive sleep apnea, during supine and non supine sleep, but pronounced during supine position and sleep associated hypoxemia:  Complications of untreated sleep apnea and treatment options for RAJIV with pros/cons were discussed.   We discussed  auto titrating CPAP as the better option considering the severity of the sleep apnea and the hypoxemia.   Patient wants some time to think and decide about what he wants to pursue for treatment sleep apnea and will get back to me via MY CHART.  Will generated the orders in epic based on what patient is interested in.    Obesity: We discussed weight management with diet and exercise.    Patient was strongly advised to avoid driving, operating any heavy machinery or other hazardous situations while drowsy or sleepy.  Patient was counseled on the importance of driving while alert, to pull over if drowsy, or nap before getting into the vehicle if sleepy.     The above note was dictated using voice recognition software. Although reviewed after completion, some word and grammatical error may remain . Please contact the author for any clarifications.      MD JUAN RAMON Noel  [facility]   [address]oor [address], [address]   [address] [place] [address]. [place] [address]   Appointments: [phone]    ADDENDUM(7/27/2020):  Patient was interested in starting CPAP treatment.  Prescribed auto CPAP 5-15 cm water.  Recommended him to use the device regularly during sleep and instructed to get back to us if he has any interface problems.    He will be followed through sleep therapy management program.  F/u via virtual visit in 6-8 weeks after starting CPAP treatment.  Recommended obtaining an overnight oximetry with CPAP before f/u visit in 6-8 weeks to check for resolution of hypoxemia.    Efra Parish MD  [facility]   [address]oor [address], [address]   [address] [place], [place] [address]. [place] [address]   Appointments: [phone]

## 2020-07-21 NOTE — TELEPHONE ENCOUNTER
Per jose needs virtual video return today am if possible. I clld pt, LMV stating if he calls back before 10am we will be able to get him in for an appt today. If not just schedule next available

## 2020-07-21 NOTE — PATIENT INSTRUCTIONS
Your BMI is Body mass index is 32.14 kg/m .  Weight management is a personal decision.  If you are interested in exploring weight loss strategies, the following discussion covers the approaches that may be successful. Body mass index (BMI) is one way to tell whether you are at a healthy weight, overweight, or obese. It measures your weight in relation to your height.  A BMI of 18.5 to 24.9 is in the healthy range. A person with a BMI of 25 to 29.9 is considered overweight, and someone with a BMI of 30 or greater is considered obese. More than two-thirds of American adults are considered overweight or obese.  Being overweight or obese increases the risk for further weight gain. Excess weight may lead to heart disease and diabetes.  Creating and following plans for healthy eating and physical activity may help you improve your health.  Weight control is part of healthy lifestyle and includes exercise, emotional health, and healthy eating habits. Careful eating habits lifelong are the mainstay of weight control. Though there are significant health benefits from weight loss, long-term weight loss with diet alone may be very difficult to achieve- studies show long-term success with dietary management in less than 10% of people. Attaining a healthy weight may be especially difficult to achieve in those with severe obesity. In some cases, medications, devices and surgical management might be considered.  What can you do?  If you are overweight or obese and are interested in methods for weight loss, you should discuss this with your provider.     Consider reducing daily calorie intake by 500 calories.     Keep a food journal.     Avoiding skipping meals, consider cutting portions instead.    Diet combined with exercise helps maintain muscle while optimizing fat loss. Strength training is particularly important for building and maintaining muscle mass. Exercise helps reduce stress, increase energy, and improves fitness.  Increasing exercise without diet control, however, may not burn enough calories to loose weight.       Start walking three days a week 10-20 minutes at a time    Work towards walking thirty minutes five days a week     Eventually, increase the speed of your walking for 1-2 minutes at time    In addition, we recommend that you review healthy lifestyles and methods for weight loss available through the National Institutes of Health patient information sites:  http://win.niddk.nih.gov/publications/index.htm    And look into health and wellness programs that may be available through your health insurance provider, employer, local community center, or [employer].

## 2020-07-31 ENCOUNTER — DOCUMENTATION ONLY (OUTPATIENT)
Dept: SLEEP MEDICINE | Facility: CLINIC | Age: 50
End: 2020-07-31

## 2020-07-31 NOTE — PROGRESS NOTES
Patient was offered choice of vendor and chose [facility].  Patient Ata Garcia was set up at [facility] on July 31, 2020. Patient received a Resmed AirSense 10 Auto. Pressures were set at 5-15 cm H2O.   Patient s ramp is 5 cm H2O for Auto and FLEX/EPR is 2.  Patient received a Resmed Mask name: [id number]  Nasal mask size Medium, heated tubing and heated humidifier.  Patient does not need to meet compliance.Laly Ashley

## 2020-08-04 ENCOUNTER — DOCUMENTATION ONLY (OUTPATIENT)
Dept: SLEEP MEDICINE | Facility: CLINIC | Age: 50
End: 2020-08-04

## 2020-08-04 NOTE — PROGRESS NOTES
Patient returned call.    Subjective measures:   Patient reports things are going well.  He tried using the machine with and without humidity. He is no longer snoring.      Assessment: Pt meeting objective benchmarks.  Patient meeting subjective benchmarks.     Action plan:pt to have 14 day [facility] visit.      Total time spent counseling, coaching  and reviewing PAP therapy data with patient  5 minutes     [id number] (this call)    [id number] no (previous call)

## 2020-08-04 NOTE — PROGRESS NOTES
3 DAY STM VISIT    Diagnostic AHI:   48  HST    Patient contacted for 3 day STM visit    Confirmed with patient at time of call- N/A Patient is still interested in STM service     Message left for patient to return call    Replacement device: No  STM ordered by provider: Yes     Device type: Auto-CPAP  PAP settings from order::  CPAP min 5 cm  H20       CPAP max 15 cm  H20        Mask type:    Nasal Mask     Device settings from machine      Min CPAP 5.0            Max CPAP 15.0     Assessment: Nightly usage over four hours.  Action plan: Patient to have 14 day STM visit. Patient has a follow up visit scheduled:   not required by insurance.     Total time spent on accessing and  interpreting remote patient PAP therapy data  10 minutes  Total time spent counseling, coaching  and reviewing PAP therapy data with patient  0 minutes  [id number] no

## 2020-08-06 ENCOUNTER — TELEPHONE (OUTPATIENT)
Dept: SLEEP MEDICINE | Facility: CLINIC | Age: 50
End: 2020-08-06

## 2020-08-06 NOTE — TELEPHONE ENCOUNTER
BAO orders have been faxed to [facility] for pt to have done with cpap use.    MURTAZA Adam

## 2020-08-18 ENCOUNTER — DOCUMENTATION ONLY (OUTPATIENT)
Dept: SLEEP MEDICINE | Facility: CLINIC | Age: 50
End: 2020-08-18
Payer: COMMERCIAL

## 2020-08-18 NOTE — PROGRESS NOTES
14  DAY STM VISIT    Diagnostic AHI:  48  HST    Subjective measures:   Patient doing well with CPAP. Patient had a stuffed up nose over the weekend but it went away.     Assessment: Pt meeting objective benchmarks.  Patient meeting subjective benchmarks.     Action plan: pt to have 30 day STM visit.      Device type: Auto-CPAP    PAP settings: CPAP min 5.0 cm  H20       CPAP max 15.0 cm  H20      95th% pressure 9.7 cm  H20        RESMED EPR level Setting: TWO    RESMED Soft response setting:  OFF    Mask type:  Nasal Mask    Objective measures: 14 day rolling measures      Compliance  92 %      Leak  17.92  lpm  last  upload      AHI 1.22   last  upload      Average number of minutes 373      Objective measure goal  Compliance   Goal >70%  Leak   Goal < 24 lpm  AHI  Goal < 5  Usage  Goal >240        Total time spent on accessing and interpreting remote patient PAP therapy data  10 minutes    Total time spent counseling, coaching  and reviewing PAP therapy data with patient  3 minutes    [id number]  [id number]  no (3 day STM)

## 2020-09-01 ENCOUNTER — DOCUMENTATION ONLY (OUTPATIENT)
Dept: SLEEP MEDICINE | Facility: CLINIC | Age: 50
End: 2020-09-01

## 2020-09-01 NOTE — PROGRESS NOTES
30 DAY STM VISIT    Diagnostic AHI:   48  HST    Subjective measures:   Pt states things are going well and has no issues or complaints.  Pt is benefiting from therapy.      Assessment: Pt meeting objective benchmarks.  Patient meeting subjective benchmarks.   Action plan: follow up per provider request  Patient has scheduled a follow up visit with Dr. Parish.  Device type: Auto-CPAP  PAP settings: CPAP min 5.0 cm  H20     CPAP max 15.0 cm  H20         95th% pressure 9.5 cm  H20      RESMED EPR level Setting: TWO    RESMED Soft response setting:  OFF  Mask type:  Nasal Mask  Objective measures: 14 day rolling measures      Compliance  100 %      Leak  18.88 lpm  last  upload      AHI 1.19   last  upload      Average number of minutes 425      Objective measure goal  Compliance   Goal >70%  Leak   Goal < 24 lpm  AHI  Goal < 5  Usage  Goal >240        Total time spent on accessing and interpreting remote patient PAP therapy data  10 minutes    Total time spent counseling, coaching  and reviewing PAP therapy data with patient  6 minutes     [id number] this call  [id number] no  at 3 or 14 day [facility]

## 2020-10-07 ENCOUNTER — APPOINTMENT (OUTPATIENT)
Dept: CT IMAGING | Facility: CLINIC | Age: 50
End: 2020-10-07
Attending: EMERGENCY MEDICINE
Payer: COMMERCIAL

## 2020-10-07 ENCOUNTER — HOSPITAL ENCOUNTER (EMERGENCY)
Facility: CLINIC | Age: 50
Discharge: HOME | End: 2020-10-07
Attending: EMERGENCY MEDICINE | Admitting: EMERGENCY MEDICINE
Payer: COMMERCIAL

## 2020-10-07 VITALS
BODY MASS INDEX: 32.14 KG/M2 | TEMPERATURE: 98.1 F | SYSTOLIC BLOOD PRESSURE: 128 MMHG | DIASTOLIC BLOOD PRESSURE: 80 MMHG | WEIGHT: 237 LBS | RESPIRATION RATE: 18 BRPM | HEART RATE: 88 BPM | OXYGEN SATURATION: 99 %

## 2020-10-07 DIAGNOSIS — S00.83XA CONTUSION OF FACE, SCALP AND NECK, INITIAL ENCOUNTER: ICD-10-CM

## 2020-10-07 DIAGNOSIS — S10.93XA CONTUSION OF FACE, SCALP AND NECK, INITIAL ENCOUNTER: ICD-10-CM

## 2020-10-07 DIAGNOSIS — S00.03XA CONTUSION OF FACE, SCALP AND NECK, INITIAL ENCOUNTER: ICD-10-CM
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
## 2020-10-07 DIAGNOSIS — R55 SYNCOPE, UNSPECIFIED SYNCOPE TYPE: ICD-10-CM

## 2020-10-07 LAB
ALBUMIN SERPL-MCNC: 3.6 G/DL (ref 3.4–5)
ALP SERPL-CCNC: 108 U/L (ref 40–150)
ALT SERPL W P-5'-P-CCNC: 40 U/L (ref 0–70)
ANION GAP SERPL CALCULATED.3IONS-SCNC: 5 MMOL/L (ref 3–14)
AST SERPL W P-5'-P-CCNC: 20 U/L (ref 0–45)
BASOPHILS # BLD AUTO: 0.1 10E9/L (ref 0–0.2)
BASOPHILS NFR BLD AUTO: 0.4 %
BILIRUB SERPL-MCNC: 0.6 MG/DL (ref 0.2–1.3)
BUN SERPL-MCNC: 13 MG/DL (ref 7–30)
CALCIUM SERPL-MCNC: 8.5 MG/DL (ref 8.5–10.1)
CHLORIDE SERPL-SCNC: 107 MMOL/L (ref 94–109)
CO2 SERPL-SCNC: 26 MMOL/L (ref 20–32)
CREAT SERPL-MCNC: 0.96 MG/DL (ref 0.66–1.25)
DIFFERENTIAL METHOD BLD: ABNORMAL
EOSINOPHIL # BLD AUTO: 0 10E9/L (ref 0–0.7)
EOSINOPHIL NFR BLD AUTO: 0.2 %
ERYTHROCYTE [DISTWIDTH] IN BLOOD BY AUTOMATED COUNT: 12.9 % (ref 10–15)
GFR SERPL CREATININE-BSD FRML MDRD: >90 ML/MIN/{1.73_M2}
GLUCOSE SERPL-MCNC: 109 MG/DL (ref 70–99)
HCT VFR BLD AUTO: 54.2 % (ref 40–53)
HGB BLD-MCNC: 18.3 G/DL (ref 13.3–17.7)
IMM GRANULOCYTES # BLD: 0.1 10E9/L (ref 0–0.4)
IMM GRANULOCYTES NFR BLD: 0.5 %
LYMPHOCYTES # BLD AUTO: 1.4 10E9/L (ref 0.8–5.3)
LYMPHOCYTES NFR BLD AUTO: 10.9 %
MCH RBC QN AUTO: 28.7 PG (ref 26.5–33)
MCHC RBC AUTO-ENTMCNC: 33.8 G/DL (ref 31.5–36.5)
MCV RBC AUTO: 85 FL (ref 78–100)
MONOCYTES # BLD AUTO: 1 10E9/L (ref 0–1.3)
MONOCYTES NFR BLD AUTO: 7.6 %
NEUTROPHILS # BLD AUTO: 10.1 10E9/L (ref 1.6–8.3)
NEUTROPHILS NFR BLD AUTO: 80.4 %
NRBC # BLD AUTO: 0 10*3/UL
NRBC BLD AUTO-RTO: 0 /100
PLATELET # BLD AUTO: 216 10E9/L (ref 150–450)
POTASSIUM SERPL-SCNC: 4.2 MMOL/L (ref 3.4–5.3)
PROT SERPL-MCNC: 6.4 G/DL (ref 6.8–8.8)
RBC # BLD AUTO: 6.38 10E12/L (ref 4.4–5.9)
SODIUM SERPL-SCNC: 138 MMOL/L (ref 133–144)
TROPONIN I SERPL-MCNC: <0.015 UG/L (ref 0–0.04)
WBC # BLD AUTO: 12.6 10E9/L (ref 4–11)

## 2020-10-07 PROCEDURE — 80053 COMPREHEN METABOLIC PANEL: CPT | Performed by: EMERGENCY MEDICINE

## 2020-10-07 PROCEDURE — 99285 EMERGENCY DEPT VISIT HI MDM: CPT | Mod: 25 | Performed by: EMERGENCY MEDICINE

## 2020-10-07 PROCEDURE — 85025 COMPLETE CBC W/AUTO DIFF WBC: CPT | Performed by: EMERGENCY MEDICINE

## 2020-10-07 PROCEDURE — 70450 CT HEAD/BRAIN W/O DYE: CPT

## 2020-10-07 PROCEDURE — 72125 CT NECK SPINE W/O DYE: CPT

## 2020-10-07 PROCEDURE — 84484 ASSAY OF TROPONIN QUANT: CPT | Performed by: EMERGENCY MEDICINE

## 2020-10-07 PROCEDURE — 93005 ELECTROCARDIOGRAM TRACING: CPT | Performed by: EMERGENCY MEDICINE

## 2020-10-07 PROCEDURE — 93010 ELECTROCARDIOGRAM REPORT: CPT | Performed by: EMERGENCY MEDICINE

## 2020-10-07 NOTE — ED NOTES
Donated plasma early this am. Working at home on computer, noted legs felt like they were falling asleep, stood up, felt light headed and flushed, next thing was he jwoke o the ground with his monitor on him. Has bruise to left cheek. Denies neck pain. Only notes a numb sensation in the thumb nad up righ forearm. No weakness,

## 2020-10-07 NOTE — ED PROVIDER NOTES
History     Chief Complaint   Patient presents with     Fall     pt fell hitting the L cheek, Pt has an abrasion where he hit the floor.     Dizziness     Pt donated plazma this am and became very dizzy and fell/ had syncopal episode.     HPI  Ata Garcia is a 50 year old male who presents after syncopal episode at home this morning.  He got up early before 6, had a light breakfast and then went to give plasma.  He reports the access was painful, it appears they accessed his cephalic vein, he describes a strange painful sensation when they flush the saline and at the end of his run.  He had a tight bandage around his right elbow, and currently describes some mild numb tingling feeling in his first second third fingers of the right hand that radiates down the volar lateral aspect of the forearm.  Denies associated weakness.  When he was at home about 730 he was sitting on the couch doing some work developed tingling in his bilateral lower legs which happens to him at times and certain positions specifically sitting with his legs crossed, he got up to try and get rid of the tingling in his legs felt diaphoretic faint and actually did faint landing on a carpeted floor striking the left cheek.  He denies associated headache currently, visual change, nausea, trismus, malocclusion, numbness weakness or imbalance, difficulty speaking or swallowing.  Denies preceding palpitations or chest discomfort.  No recent febrile's cough congestion sore throat nausea vomiting diarrhea black or bloody stool.  He did drink about 30 ounces of fluid this morning.    Allergies:  No Known Allergies    Problem List:    Patient Active Problem List    Diagnosis Date Noted     Obesity, Class I, BMI 30-34.9 09/13/2016     Priority: Medium     Ocular hypertension 10/24/2011     Priority: Medium     Target IOP:    Peak IOP: 30  GDX: prob nl  HVF:    Pachymetry: thick  C/D: 0.40.4  SLT:           CARDIOVASCULAR SCREENING; LDL GOAL LESS THAN  160 10/31/2010     Priority: Medium        Past Medical History:    Past Medical History:   Diagnosis Date     Hypercholesterolemia      RAJIV (obstructive sleep apnea)      Prostatitis        Past Surgical History:    Past Surgical History:   Procedure Laterality Date     COLONOSCOPY WITH CO2 INSUFFLATION N/A 10/5/2016    Procedure: COLONOSCOPY WITH CO2 INSUFFLATION;  Surgeon: Glenn Joiner DO;  Location: [facility] OR     HC REMOVAL GALLBLADDER  10/2004     TONSILLECTOMY & ADENOIDECTOMY  2001    for RAJIV     UVULECTOMY  2001    for RAJIV     VASECTOMY  2002       Family History:    Family History   Problem Relation Age of Onset     Lymphoma Father         non-H     Diabetes Father         type 2     Breast Cancer Sister      Diabetes Maternal Grandfather         type 1     Glaucoma No family hx of      Macular Degeneration No family hx of      Hypertension No family hx of      Cerebrovascular Disease No family hx of      Thyroid Disease No family hx of      Coronary Artery Disease No family hx of        Social History:  Marital Status:   [2]  Social History     Tobacco Use     Smoking status: Never Smoker     Smokeless tobacco: Never Used   Substance Use Topics     Alcohol use: Yes     Comment: occasionally     Drug use: No        Medications:         benzonatate (TESSALON) 100 MG capsule       fexofenadine (ALLEGRA) 180 MG tablet       predniSONE (DELTASONE) 20 MG tablet       sildenafil (VIAGRA) 100 MG tablet          Review of Systems  All other systems reviewed and are negative.    Physical Exam   BP: 134/86  Pulse: 95  Temp: 98.1  F (36.7  C)  Resp: 18  Weight: 107.5 kg (237 lb)  SpO2: 100 %      Physical Exam  Nontoxic-appearing no respiratory distress alert and oriented x3. GCS 15 on arrival, throughout stay and at discharge.    Head atraumatic normocephalic    Contusion abrasion left cheek left upper lip, no associated crepitus or bony instability, mandible is full range of motion without trismus or  malocclusion, dentition intact, maxilla intact.    Cranial nerves; vision baseline fields intact, PERRL, EOMI, facial sensation intact to light touch, facial muscle tone intact and symmetrical, hearing grossly intact,swallowing without difficulty, voice baseline and normal, SCM  strength intact, tongue protrudes midline.  Palatal elevation symmetric    TM's unremarkable, EACs clear, oropharynx moist without lesions or erythema.    Neck supple full active range of motion with discomfort right paracervical musculature, no posterior midline tenderness    Spine nontender to palpation    Pelvis stable nontender    Chest nontender    No outward sign of trauma to the chest back or abdomen    Lungs clear to auscultation no rales rhonchi or wheezes    Heart regular no murmur S3 or rub    Abdomen soft nontender bowel sounds positive no masses or HSM    Strength and sensation intact throughout the extremities, skin clear from rash or lesion.    Extremities are atraumatic, full painless active range of motion all joints      ED Course        Procedures           ECG: Normal rate and rhythm, axis and intervals within normal limits, no acute ST or T wave changes. Read by Dr. Priyank Hung        Critical Care time:  none               Results for orders placed or performed during the hospital encounter of 10/07/20 (from the past 24 hour(s))   CBC with platelets differential   Result Value Ref Range    WBC 12.6 (H) 4.0 - 11.0 10e9/L    RBC Count 6.38 (H) 4.4 - 5.9 10e12/L    Hemoglobin 18.3 (H) 13.3 - 17.7 g/dL    Hematocrit 54.2 (H) 40.0 - 53.0 %    MCV 85 78 - 100 fl    MCH 28.7 26.5 - 33.0 pg    MCHC 33.8 31.5 - 36.5 g/dL    RDW 12.9 10.0 - 15.0 %    Platelet Count 216 150 - 450 10e9/L    Diff Method Automated Method     % Neutrophils 80.4 %    % Lymphocytes 10.9 %    % Monocytes 7.6 %    % Eosinophils 0.2 %    % Basophils 0.4 %    % Immature Granulocytes 0.5 %    Nucleated RBCs 0 0 /100    Absolute Neutrophil 10.1 (H) 1.6 -  8.3 10e9/L    Absolute Lymphocytes 1.4 0.8 - 5.3 10e9/L    Absolute Monocytes 1.0 0.0 - 1.3 10e9/L    Absolute Eosinophils 0.0 0.0 - 0.7 10e9/L    Absolute Basophils 0.1 0.0 - 0.2 10e9/L    Abs Immature Granulocytes 0.1 0 - 0.4 10e9/L    Absolute Nucleated RBC 0.0    Comprehensive metabolic panel   Result Value Ref Range    Sodium 138 133 - 144 mmol/L    Potassium 4.2 3.4 - 5.3 mmol/L    Chloride 107 94 - 109 mmol/L    Carbon Dioxide 26 20 - 32 mmol/L    Anion Gap 5 3 - 14 mmol/L    Glucose 109 (H) 70 - 99 mg/dL    Urea Nitrogen 13 7 - 30 mg/dL    Creatinine 0.96 0.66 - 1.25 mg/dL    GFR Estimate >90 >60 mL/min/[1.73_m2]    GFR Estimate If Black >90 >60 mL/min/[1.73_m2]    Calcium 8.5 8.5 - 10.1 mg/dL    Bilirubin Total 0.6 0.2 - 1.3 mg/dL    Albumin 3.6 3.4 - 5.0 g/dL    Protein Total 6.4 (L) 6.8 - 8.8 g/dL    Alkaline Phosphatase 108 40 - 150 U/L    ALT 40 0 - 70 U/L    AST 20 0 - 45 U/L   Troponin I   Result Value Ref Range    Troponin I ES <0.015 0.000 - 0.045 ug/L   Head CT w/o contrast    Narrative    CT SCAN OF THE HEAD WITHOUT CONTRAST   10/7/2020 12:27 PM     HISTORY: Closed head injury    TECHNIQUE:  Axial images of the head and coronal reformations without  IV contrast material. Radiation dose for this scan was reduced using  automated exposure control, adjustment of the mA and/or kV according  to patient size, or iterative reconstruction technique.    COMPARISON: None.    FINDINGS:     Intracranial contents: The ventricles are normal in size, shape and  configuration.  The brain parenchyma and subarachnoid spaces are  normal. There is no evidence of intracranial hemorrhage, mass, acute  infarct or anomaly.    Visualized orbits/sinuses/mastoids:  The visualized portions of the  sinuses and mastoids appear normal.    Osseous structures/soft tissues:  There is no evidence of trauma. No  intracranial hemorrhage or skull fractures are identified.      Impression    IMPRESSION: Negative, no bleed or fractures  are identified.      MARQUEZ GERARDO MD   Cervical spine CT w/o contrast    Narrative    CT CERVICAL SPINE WITHOUT CONTRAST   10/7/2020 12:29 PM     HISTORY: fall neck pain     TECHNIQUE: Axial images of the cervical spine were obtained without  intravenous contrast. Multiplanar reformations were performed.   Radiation dose for this scan was reduced using automated exposure  control, adjustment of the mA and/or kV according to patient size, or  iterative reconstruction technique.    COMPARISON: None.    FINDINGS: There is no evidence of fracture. There is reversal of the  cervical lordosis.      Craniocervical junction: Normal.     C1-C2:  Normal.     C2-C3:  Normal disc, facet joints, spinal canal and neural foramina.     C3-C4:  Normal disc, facet joints, spinal canal and neural foramina.     C4-C5:  Slight loss of disc space height. Mild annular disc bulge.  Central canal and neural foramen are patent.     C5-C6:  Normal disc, facet joints, spinal canal and neural foramina.      C6-C7:  Soft tissues are obscured by streak artifact. Bony central  canal is normal. Mild-moderate left foraminal stenosis due to an  uncinate spur.      C7-T1:   Soft tissues are secured by streak artifact. The bony central  canal is normal. Moderate degenerative change in the right facet  joint.      Impression    IMPRESSION:    1. No fractures are identified.  2. Mild degenerative changes.    MARQUEZ GERARDO MD       Medications - No data to display    Assessments & Plan (with Medical Decision Making)  50-year-old male syncopal episode this morning detailed per HPI.  Likely secondary to hypovolemia given that he gave plasma this morning.  He did have oral intake, blood sugar here is normal.  There is no history of or evidence for infection or metabolic derangement.  Cardiac work-up is unremarkable including normal EKG and troponin.  No dysrhythmia in ED, vitals remain within normal limits.  CT head CT cervical spine are unremarkable for acute  finding.  Overall findings are consistent with a vasovagal episode likely triggered by hypovolemia secondary to giving plasma.  Patient discharged home feeling well.  Recommend follow-up primary care for further evaluation.  Return criteria reviewed     I have reviewed the nursing notes.    I have reviewed the findings, diagnosis, plan and need for follow up with the patient.       Discharge Medication List as of 10/7/2020  1:06 PM          Final diagnoses:   Fall, initial encounter   Contusion of face, scalp and neck, initial encounter   Syncope, unspecified syncope type       10/7/2020   [facility] EMERGENCY DEPT     Priyank Hung MD  10/08/20 1516

## 2020-10-07 NOTE — ED AVS SNAPSHOT
[facility] Emergency Dept  [address] [address]  Phone: [phone]  Fax: [phone]                                    Ata Garcia   MRN: [id number]    Department: [facility] Emergency Dept   Date of Visit: 10/7/2020           After Visit Summary Signature Page    I have received my discharge instructions, and my questions have been answered. I have discussed any challenges I see with this plan with the nurse or doctor.    ..........................................................................................................................................  Patient/Patient Representative Signature      ..........................................................................................................................................  Patient Representative Print Name and Relationship to Patient    ..................................................               ................................................  Date                                   Time    ..........................................................................................................................................  Reviewed by Signature/Title    ...................................................              ..............................................  Date                                               Time          22EPIC Rev 08/18

## 2020-10-07 NOTE — DISCHARGE INSTRUCTIONS
Ice, ibuprofen Tylenol    Return here for chest pain, palpitations, passing out, focal neurologic change or any other concern

## 2020-12-13 ENCOUNTER — HEALTH MAINTENANCE LETTER (OUTPATIENT)
Age: 50
End: 2020-12-13

## 2021-04-08 ENCOUNTER — IMMUNIZATION (OUTPATIENT)
Dept: LAB | Facility: CLINIC | Age: 51
End: 2021-04-08
Payer: COMMERCIAL

## 2021-04-17 ENCOUNTER — HEALTH MAINTENANCE LETTER (OUTPATIENT)
Age: 51
End: 2021-04-17

## 2021-06-01 ENCOUNTER — OFFICE VISIT (OUTPATIENT)
Dept: OPTOMETRY | Facility: CLINIC | Age: 51
End: 2021-06-01
Payer: COMMERCIAL

## 2021-06-01 DIAGNOSIS — H52.4 PRESBYOPIA: ICD-10-CM

## 2021-06-01 DIAGNOSIS — Z01.00 EXAMINATION OF EYES AND VISION: Primary | ICD-10-CM

## 2021-06-01 DIAGNOSIS — H52.223 REGULAR ASTIGMATISM OF BOTH EYES: ICD-10-CM

## 2021-06-01 DIAGNOSIS — H52.13 MYOPIA OF BOTH EYES: ICD-10-CM

## 2021-06-01 DIAGNOSIS — H40.053 BILATERAL OCULAR HYPERTENSION: ICD-10-CM

## 2021-06-01 PROCEDURE — 92015 DETERMINE REFRACTIVE STATE: CPT | Performed by: OPTOMETRIST

## 2021-06-01 PROCEDURE — 92004 COMPRE OPH EXAM NEW PT 1/>: CPT | Performed by: OPTOMETRIST

## 2021-06-01 ASSESSMENT — VISUAL ACUITY
OD_SC: 20/20-2
OD_SC: 20/70
OD_CC: 20/20
OS_CC: 20/20
OS_SC: 20/30-1
OS_CC+: -1
OS_SC: 20/100
METHOD: SNELLEN - LINEAR
CORRECTION_TYPE: GLASSES

## 2021-06-01 ASSESSMENT — REFRACTION_WEARINGRX
OS_SPHERE: -2.25
OD_SPHERE: -1.75
OD_CYLINDER: +0.25
OS_CYLINDER: +0.50
OS_AXIS: 17
OD_CYLINDER: +0.25
OS_SPHERE: -2.25
OD_SPHERE: -1.75
SPECS_TYPE: SVL
OS_ADD: +2.00
OS_CYLINDER: +0.50
OD_AXIS: 155
OD_AXIS: 155
OS_AXIS: 17
OD_ADD: +2.00

## 2021-06-01 ASSESSMENT — REFRACTION_MANIFEST
OD_ADD: +2.25
OS_SPHERE: -2.25
OS_CYLINDER: +0.75
OS_ADD: +2.25
OD_AXIS: 155
OS_AXIS: 017
OD_CYLINDER: +0.50
OD_SPHERE: -2.00

## 2021-06-01 ASSESSMENT — TONOMETRY
IOP_METHOD: TONOPEN
OD_IOP_MMHG: 23
OS_IOP_MMHG: 20

## 2021-06-01 ASSESSMENT — CONF VISUAL FIELD
OS_NORMAL: 1
OD_NORMAL: 1

## 2021-06-01 ASSESSMENT — SLIT LAMP EXAM - LIDS
COMMENTS: NORMAL
COMMENTS: NORMAL

## 2021-06-01 ASSESSMENT — CUP TO DISC RATIO
OD_RATIO: 0.35
OS_RATIO: 0.3

## 2021-06-01 ASSESSMENT — EXTERNAL EXAM - LEFT EYE: OS_EXAM: BROW PTOSIS

## 2021-06-01 ASSESSMENT — EXTERNAL EXAM - RIGHT EYE: OD_EXAM: BROW PTOSIS

## 2021-06-01 NOTE — PROGRESS NOTES
Chief Complaint   Patient presents with     Annual Eye Exam      Accompanied by self  Last Eye Exam: 1-  Dilated Previously: Yes    What are you currently using to see?  glasses       Distance Vision Acuity: Satisfied with vision    Near Vision Acuity: Satisfied with vision while reading  Unaided,pt takes glasses off to read     Eye Comfort: good  Do you use eye drops? : No  Occupation or Hobbies: computers     Latrice Jackman Optometric Assistant, A.B.O.C.          Medical, surgical and family histories reviewed and updated 6/1/2021.       OBJECTIVE: See Ophthalmology exam    ASSESSMENT:    ICD-10-CM    1. Examination of eyes and vision  Z01.00 EYE EXAM (SIMPLE-NONBILLABLE)   2. Myopia of both eyes  H52.13 REFRACTION   3. Regular astigmatism of both eyes  H52.223 REFRACTION   4. Presbyopia  H52.4 REFRACTION   5. Bilateral ocular hypertension  H40.053 EYE EXAM (SIMPLE-NONBILLABLE)    Thick corneas both eyes      PLAN:     Patient Instructions   Eyeglass prescription given.    Return in 1 year for a complete eye exam or sooner if needed.    Paulino Sanchez, OD

## 2021-06-01 NOTE — PATIENT INSTRUCTIONS
Eyeglass prescription given.    Return in 1 year for a complete eye exam or sooner if needed.    Paulino Sanchez OD    The affects of the dilating drops last for 4- 6 hours.  You will be more sensitive to light and vision will be blurry up close.  Do not drive if you do not feel comfortable.  Mydriatic sunglasses were given if needed.      Optometry Providers       Clinic Locations                                 Telephone Number   Dr. Estelle Good   [place]  [place] [phone]     [place] [facility] Hours:                [facility] Hours:       [facility] Hours:   [address]   [address]     [address]  [place] [address]   [place] [address]    [place] [address]  Phone: [phone]                    Phone: [phone]     Phone: [phone]                      Monday 8:00-7:00                          Monday 8:00-7:00                          Monday 8:00-7:00              Tuesday 8:00-6:00                          Tuesday 8:00-7:00                          Tuesday 8:00-7:00              Wednesday 8:00-6:00                  Wednesday 8:00-7:00                   Wednesday 8:00-7:00      Thursday 8:00-6:00                        Thursday 8:00-7:00                         Thursday 8:00-7:00            Friday 8:00-5:00                              Friday 8:00-5:00                              Friday 8:00-5:00    [facility] Hours:   [address] [facility] [place] [address]  [phone]    Monday 8:00-7:00  Tuesday 8:00-7:00  Wednesday 8:00-7:00  Thursday 8:00-7:00  Friday 8:00-5:00  Please log on to [email or web address] to order your contact lenses.  The link is found on the [facility] page.  As always, Thank you for trusting us with your health care needs!

## 2021-06-01 NOTE — LETTER
6/1/2021         RE: Ata Garcia  [address]  [place] [address]        Dear Colleague,    Thank you for referring your patient, Ata Garcia, to the [facility]. Please see a copy of my visit note below.    Chief Complaint   Patient presents with     Annual Eye Exam      Accompanied by self  Last Eye Exam: 1-  Dilated Previously: Yes    What are you currently using to see?  glasses       Distance Vision Acuity: Satisfied with vision    Near Vision Acuity: Satisfied with vision while reading  Unaided,pt takes glasses off to read     Eye Comfort: good  Do you use eye drops? : No  Occupation or Hobbies: computers     Latrice Jackman Optometric Assistant, A.B.O.C.          Medical, surgical and family histories reviewed and updated 6/1/2021.       OBJECTIVE: See Ophthalmology exam    ASSESSMENT:    ICD-10-CM    1. Examination of eyes and vision  Z01.00 EYE EXAM (SIMPLE-NONBILLABLE)   2. Myopia of both eyes  H52.13 REFRACTION   3. Regular astigmatism of both eyes  H52.223 REFRACTION   4. Presbyopia  H52.4 REFRACTION   5. Bilateral ocular hypertension  H40.053 EYE EXAM (SIMPLE-NONBILLABLE)    Thick corneas both eyes      PLAN:     Patient Instructions   Eyeglass prescription given.    Return in 1 year for a complete eye exam or sooner if needed.    Paulino Sanchez, PAUL           Again, thank you for allowing me to participate in the care of your patient.        Sincerely,        Paulino Sanchez, OD

## 2021-08-09 DIAGNOSIS — G47.33 OSA (OBSTRUCTIVE SLEEP APNEA): Primary | ICD-10-CM

## 2021-09-24 ENCOUNTER — ANCILLARY PROCEDURE (OUTPATIENT)
Dept: GENERAL RADIOLOGY | Facility: CLINIC | Age: 51
End: 2021-09-24
Attending: PHYSICIAN ASSISTANT
Payer: COMMERCIAL

## 2021-09-24 ENCOUNTER — OFFICE VISIT (OUTPATIENT)
Dept: URGENT CARE | Facility: URGENT CARE | Age: 51
End: 2021-09-24
Payer: COMMERCIAL

## 2021-09-24 VITALS
BODY MASS INDEX: 31.9 KG/M2 | DIASTOLIC BLOOD PRESSURE: 78 MMHG | WEIGHT: 235.2 LBS | OXYGEN SATURATION: 98 % | HEART RATE: 71 BPM | RESPIRATION RATE: 16 BRPM | TEMPERATURE: 96.8 F | SYSTOLIC BLOOD PRESSURE: 126 MMHG

## 2021-09-24 DIAGNOSIS — S59.902A ELBOW INJURY, LEFT, INITIAL ENCOUNTER: Primary | ICD-10-CM

## 2021-09-24 DIAGNOSIS — S59.902A ELBOW INJURY, LEFT, INITIAL ENCOUNTER: ICD-10-CM

## 2021-09-24 PROCEDURE — 73070 X-RAY EXAM OF ELBOW: CPT | Mod: LT | Performed by: RADIOLOGY

## 2021-09-24 PROCEDURE — 99213 OFFICE O/P EST LOW 20 MIN: CPT | Performed by: PHYSICIAN ASSISTANT

## 2021-09-24 NOTE — PROGRESS NOTES
Chief Complaint   Patient presents with     Musculoskeletal Problem     biceps pain-left side, tried to pull shoes on yesterday and fell              Medical Decision Making:    Differential Diagnosis:  MS Injury Pain: sprain, fracture, tendonitis, muscle strain, contusion, dislocation, bursitis and osteoarthritis          ASSESSMENT:    ICD-10-CM    1. Elbow injury, left, initial encounter  S59.902A XR Elbow Left 2 Views     Orthopedic  Referral     CANCELED: Wrist/Arm/Hand Supplies Order for DME - ONLY FOR DME             PLAN: Left elbow injury.  Possible biceps tendon injury.  Sling, Ace, ice, ibuprofen.  Declined sling.  See Ortho.  Advised about symptoms which might herald more serious problems.            Kera Garrido PA-C      SUBJECTIVE:   Ata Garcia is an 51 year old male who presents with medial left biceps/elbow pain.  He was camping yesterday and was putting on his shoes when he lost his balance.  He leaned and  rolled and put his arm out to avoid hitting a table and felt sudden pain in the left medial elbow/biceps area.  Hurts with movement.  He is right-handed.  No numbness or tingling.  No fever.    No Known Allergies    Past Medical History:   Diagnosis Date     Hypercholesterolemia      RAJIV (obstructive sleep apnea)     treated surgically     Prostatitis        fexofenadine (ALLEGRA) 180 MG tablet, Take 180 mg by mouth daily (Patient not taking: Reported on 9/24/2021)  sildenafil (VIAGRA) 100 MG tablet, TAKE ONE-HALF TO ONE TABLET BY MOUTH 30 MINUTES TO 4 HOURS BEFORE SEXUAL ACTIVITY (Patient not taking: Reported on 9/24/2021)    No current facility-administered medications on file prior to visit.      Social History     Tobacco Use     Smoking status: Never Smoker     Smokeless tobacco: Never Used   Substance Use Topics     Alcohol use: Yes     Comment: occasionally     Drug use: No       ROS:  Gen: no fevers  Musculoskel: + as above  Skin: as above    OBJECTIVE:  /78  (BP Location: Right arm, Patient Position: Sitting, Cuff Size: Adult Large)   Pulse 71   Temp 96.8  F (36  C) (Tympanic)   Resp 16   Wt 106.7 kg (235 lb 3.2 oz)   SpO2 98%   BMI 31.90 kg/m     General:   awake, alert, and cooperative.  NAD.   Head: Normocephalic, atraumatic.  Eyes: Conjunctiva clear,   MS: Good palpable left radial pulse.  Epicondyles and olecranon are nontender.  Tender left medial elbow/lower biceps area.  Some fullness in the biceps area compared to the other arm no bruising.  Neuro: Alert and oriented - normal speech.      SEBASTIEN PortilloC

## 2021-09-26 ENCOUNTER — HEALTH MAINTENANCE LETTER (OUTPATIENT)
Age: 51
End: 2021-09-26

## 2021-10-01 ENCOUNTER — OFFICE VISIT (OUTPATIENT)
Dept: ORTHOPEDICS | Facility: CLINIC | Age: 51
End: 2021-10-01
Attending: PHYSICIAN ASSISTANT
Payer: COMMERCIAL

## 2021-10-01 VITALS
WEIGHT: 235 LBS | BODY MASS INDEX: 31.83 KG/M2 | DIASTOLIC BLOOD PRESSURE: 78 MMHG | HEIGHT: 72 IN | SYSTOLIC BLOOD PRESSURE: 126 MMHG

## 2021-10-01 DIAGNOSIS — S59.902A ELBOW INJURY, LEFT, INITIAL ENCOUNTER: ICD-10-CM

## 2021-10-01 PROCEDURE — 99204 OFFICE O/P NEW MOD 45 MIN: CPT | Performed by: PEDIATRICS

## 2021-10-01 ASSESSMENT — MIFFLIN-ST. JEOR: SCORE: 1958.95

## 2021-10-01 NOTE — PROGRESS NOTES
"ASSESSMENT & PLAN    Ata was seen today for injury.    Diagnoses and all orders for this visit:    Elbow injury, left, initial encounter  -     Orthopedic  Referral  -     MR Elbow Left w/o Contrast; Future      Discussed options of 1) monitoring, therapy, with goal of improved function but not anticipating full return to \"normal;\" 2) MRI, with likely referral to ortho surgery if tear noted.  He prefers the latter.  MRI next.  Questions answered. Discussed signs and symptoms that may indicate more serious issues; the patient was instructed to seek appropriate care if noted. Ata indicates understanding of these issues and agrees with the plan.        See Patient Instructions  Patient Instructions   Concern for left distal biceps tendon rupture.  Icing, over-the-counter medication as needed for soreness, if desired.  May use sling for comfort if desired, not required.  MRI of the left elbow next.  Plan to contact you with results.  If distal biceps tendon tear is confirmed, anticipate referral to orthopedic surgery next.    Advanced imaging is done by appointment. Some insurance companies may require a prior authorization to be completed which can delay the time until you are able to schedule your appointment.   Please call [place] [place] [facility] [place]: [phone] to schedule your MRI.  Depending on your availability you can usually schedule within the next 1-2 days.  If you are active on [employer], you may have access to your test results before your provider is able to review the study and advise on next steps.      The clinic will call you with results, if you have not heard from the clinic within 2-3 days following your MRI please contact us at the number listed below.       If you have any further questions for your physician or physician s care team you can call [phone] and use option 3 to leave a voice message. Calls received during business hours will be returned same " day.        DO JUAN RAMON [facility]    -----  Chief Complaint   Patient presents with     Left Elbow - Injury       SUBJECTIVE  Ata Garcia is a/an 51 year old male who is seen in consultation at the request of  Kera Garrido PA-C for evaluation of a left elbow injury. 9/23/21 He was trying to put on his shoes while in a camper when he started to fall and went to catch himself.  He had immediate pain in his left elbow, distal biceps.  He has noticed a divot in his fossa.  Pain with use of his arm, activating his biceps.    X rays taken at .       The patient is seen by themselves.  The patient is Right handed    Onset: 8 day(s) ago. Patient describes injury as caught himself while falling.   Location of Pain: left distal biceps  Worsened by: use   Better with: rest  Treatments tried: no treatment tried to date  Associated symptoms: weakness of biceps     Orthopedic/Surgical history: NO  Social History/Occupation: IT     No family history pertinent to patient's pro/blem today.   **  No noted pop sound.   Since the injury, notes with trying to lift more than 5-10 lbs gets pain.  Also pain with trying to stretch the area.  Pain is in mid-distal biceps area.  Has noted defect in left upper arm/anterior elbow.  No bruising.      REVIEW OF SYSTEMS:  Review of Systems   All other systems reviewed and are negative.        OBJECTIVE:  /78   Ht 1.829 m (6')   Wt 106.6 kg (235 lb)   BMI 31.87 kg/m     General: healthy, alert and in no distress  HEENT: no scleral icterus or conjunctival erythema  Skin: no suspicious lesions or rash. No jaundice.  CV: distal perfusion intact   Resp: normal respiratory effort without conversational dyspnea   Psych: normal mood and affect  Gait: normal steady gait with appropriate coordination and balance   Neuro: Normal light sensory exam of  extremity       Left Elbow exam:    Inspection:     no ecchymosis       Mild diffuse  swelling  Notable deformity distal biceps area, consistent with distal biceps tendon rupture    ROM:    grossly full with flexion, extension, forearm supination and pronation.  Pain end of extension, end of supination    Strength: reduced with flexion, supination, some pain present    Sensation: grossly intact    Palpable asymmetry in distal biceps area         RADIOLOGY:  I independently visualized and reviewed these images with the patient  No bony abnormality noted.    Results for orders placed or performed in visit on 09/24/21   XR Elbow Left 2 Views    Narrative    XR ELBOW LT 2 VW   9/24/2021 2:45 PM     HISTORY:  fell putting on shoes. Braced arm to avoid hitting table.  Tender medial volar elbow and bicepes area; Elbow injury, left,  initial encounter      Impression    IMPRESSION: Unremarkable exam.    MARICHUY ROBLES MD         SYSTEM ID:  [id number]           Review of prior external note(s) from - referring  Review of the result(s) of each unique test - imaging  Independent interpretation of a test performed by another physician/other qualified health care professional (not separately reported) - imaging

## 2021-10-01 NOTE — PATIENT INSTRUCTIONS
Concern for left distal biceps tendon rupture.  Icing, over-the-counter medication as needed for soreness, if desired.  May use sling for comfort if desired, not required.  MRI of the left elbow next.  Plan to contact you with results.  If distal biceps tendon tear is confirmed, anticipate referral to orthopedic surgery next.    Advanced imaging is done by appointment. Some insurance companies may require a prior authorization to be completed which can delay the time until you are able to schedule your appointment.   Please call [place] [place] [facility] [place]: [phone] to schedule your MRI.  Depending on your availability you can usually schedule within the next 1-2 days.  If you are active on [employer], you may have access to your test results before your provider is able to review the study and advise on next steps.      The clinic will call you with results, if you have not heard from the clinic within 2-3 days following your MRI please contact us at the number listed below.       If you have any further questions for your physician or physician s care team you can call [phone] and use option 3 to leave a voice message. Calls received during business hours will be returned same day.

## 2021-10-01 NOTE — LETTER
"    10/1/2021         RE: Ata Garcia  [address] [address]        Dear Colleague,    Thank you for referring your patient, Ata Garcia, to the [facility]. Please see a copy of my visit note below.    ASSESSMENT & PLAN    Ata was seen today for injury.    Diagnoses and all orders for this visit:    Elbow injury, left, initial encounter  -     Orthopedic  Referral  -     MR Elbow Left w/o Contrast; Future      Discussed options of 1) monitoring, therapy, with goal of improved function but not anticipating full return to \"normal;\" 2) MRI, with likely referral to ortho surgery if tear noted.  He prefers the latter.  MRI next.  Questions answered. Discussed signs and symptoms that may indicate more serious issues; the patient was instructed to seek appropriate care if noted. Ata indicates understanding of these issues and agrees with the plan.        See Patient Instructions  Patient Instructions   Concern for left distal biceps tendon rupture.  Icing, over-the-counter medication as needed for soreness, if desired.  May use sling for comfort if desired, not required.  MRI of the left elbow next.  Plan to contact you with results.  If distal biceps tendon tear is confirmed, anticipate referral to orthopedic surgery next.    Advanced imaging is done by appointment. Some insurance companies may require a prior authorization to be completed which can delay the time until you are able to schedule your appointment.   Please call [place] [facility] [place]: [phone] to schedule your MRI.  Depending on your availability you can usually schedule within the next 1-2 days.  If you are active on [employer], you may have access to your test results before your provider is able to review the study and advise on next steps.      The clinic will call you with results, if you have not heard from the clinic within 2-3 days following your MRI " please contact us at the number listed below.       If you have any further questions for your physician or physician s care team you can call [phone] and use option 3 to leave a voice message. Calls received during business hours will be returned same day.        Priyank Vaughn DO  [facility]    -----  Chief Complaint   Patient presents with     Left Elbow - Injury       SUBJECTIVE  Ata Garcia is a/an 51 year old male who is seen in consultation at the request of  Kera Garrido PA-C for evaluation of a left elbow injury. 9/23/21 He was trying to put on his shoes while in a camper when he started to fall and went to catch himself.  He had immediate pain in his left elbow, distal biceps.  He has noticed a divot in his fossa.  Pain with use of his arm, activating his biceps.    X rays taken at .       The patient is seen by themselves.  The patient is Right handed    Onset: 8 day(s) ago. Patient describes injury as caught himself while falling.   Location of Pain: left distal biceps  Worsened by: use   Better with: rest  Treatments tried: no treatment tried to date  Associated symptoms: weakness of biceps     Orthopedic/Surgical history: NO  Social History/Occupation: IT     No family history pertinent to patient's pro/blem today.   **  No noted pop sound.   Since the injury, notes with trying to lift more than 5-10 lbs gets pain.  Also pain with trying to stretch the area.  Pain is in mid-distal biceps area.  Has noted defect in left upper arm/anterior elbow.  No bruising.      REVIEW OF SYSTEMS:  Review of Systems   All other systems reviewed and are negative.        OBJECTIVE:  /78   Ht 1.829 m (6')   Wt 106.6 kg (235 lb)   BMI 31.87 kg/m     General: healthy, alert and in no distress  HEENT: no scleral icterus or conjunctival erythema  Skin: no suspicious lesions or rash. No jaundice.  CV: distal perfusion intact   Resp: normal respiratory effort  without conversational dyspnea   Psych: normal mood and affect  Gait: normal steady gait with appropriate coordination and balance   Neuro: Normal light sensory exam of  extremity       Left Elbow exam:    Inspection:     no ecchymosis       Mild diffuse swelling  Notable deformity distal biceps area, consistent with distal biceps tendon rupture    ROM:    grossly full with flexion, extension, forearm supination and pronation.  Pain end of extension, end of supination    Strength: reduced with flexion, supination, some pain present    Sensation: grossly intact    Palpable asymmetry in distal biceps area         RADIOLOGY:  I independently visualized and reviewed these images with the patient  No bony abnormality noted.    Results for orders placed or performed in visit on 09/24/21   XR Elbow Left 2 Views    Narrative    XR ELBOW LT 2 VW   9/24/2021 2:45 PM     HISTORY:  fell putting on shoes. Braced arm to avoid hitting table.  Tender medial volar elbow and bicepes area; Elbow injury, left,  initial encounter      Impression    IMPRESSION: Unremarkable exam.    MARICHUY ROBLES MD         SYSTEM ID:  [id number]           Review of prior external note(s) from - referring  Review of the result(s) of each unique test - imaging  Independent interpretation of a test performed by another physician/other qualified health care professional (not separately reported) - imaging               Again, thank you for allowing me to participate in the care of your patient.        Sincerely,        Priyank Vaughn DO

## 2021-10-04 ENCOUNTER — TELEPHONE (OUTPATIENT)
Dept: ORTHOPEDICS | Facility: CLINIC | Age: 51
End: 2021-10-04

## 2021-10-04 ENCOUNTER — HOSPITAL ENCOUNTER (OUTPATIENT)
Dept: MRI IMAGING | Facility: CLINIC | Age: 51
Discharge: HOME | End: 2021-10-04
Attending: PEDIATRICS | Admitting: PEDIATRICS
Payer: COMMERCIAL

## 2021-10-04 DIAGNOSIS — S59.902A ELBOW INJURY, LEFT, INITIAL ENCOUNTER: ICD-10-CM

## 2021-10-04 DIAGNOSIS — S59.902D ELBOW INJURY, LEFT, SUBSEQUENT ENCOUNTER: ICD-10-CM

## 2021-10-04 DIAGNOSIS — S46.219A BICEPS TENDON TEAR: Primary | ICD-10-CM

## 2021-10-04 PROCEDURE — 73221 MRI JOINT UPR EXTREM W/O DYE: CPT | Mod: 26 | Performed by: RADIOLOGY

## 2021-10-04 PROCEDURE — 73221 MRI JOINT UPR EXTREM W/O DYE: CPT | Mod: LT

## 2021-10-04 NOTE — TELEPHONE ENCOUNTER
Spoke to patient discussed MRI results and recommendation.  Ortho  placed and scheduling info provided to the patient.    Michael Frias, ATC

## 2021-10-04 NOTE — TELEPHONE ENCOUNTER
Results for orders placed or performed during the hospital encounter of 10/04/21   MR Elbow Left w/o Contrast    Narrative    MRI of left elbow without  contrast 10/4/2021 7:34 AM    History: Elbow trauma, neurovasc/lig/tendon injury suspected; evaluate  distal biceps tendon; Elbow injury, left, initial encounter    Techniques: Multiplanar multisequence imaging through the left elbow  were obtained without administration of intravenous gadolinium  contrast.    Additional History from EMR: 51-year-old male with left elbow pain at  the level of the distal biceps secondary to a fall on 9/23/2021.    Comparison: Radiographs 9/24/2021    Findings:    Medial compartment  Ulnar collateral ligament: Intact    Common flexor tendon: Intact without tendinosis.    Medial epicondyle: No edema.    Lateral compartment  Radial collateral ligament: Intact    Lateral ulnar collateral ligament: Intact.    Radial annular ligament: Intact.    Common extensor tendon: Intact without tendinosis.    Lateral epicondyle: No edema.    Posterior compartment  Triceps: Intact without tendinosis.    Olecranon: No edema.    Bursitis: Subcutaneous edema posterior to the olecranon. No large  bursal effusion.    Anterior compartment  Biceps: Full-thickness tear of both the long and short head radial  attachment. Extensively frayed tendon is retracted approximately 6 cm  from the insertion. Tearing of the lacertus fibrosis.    Brachialis: Intact.    Bicipitoradial bursa: Soft tissue edema at the level of the  bicipitoradial bursa.    Articulations  No evidence of erosion. No significant effusion.    Bones (other than subarticular marrow): No evidence of fracture,  marrow contusion or marrow infiltrative change.    Others:    Nerves: Ulnar nerve and cubital tunnel are normal.    Soft tissue edema on the anterior forearm. Subcutaneous edema on the  medial and posterior forearm.      Impression    Impression:    Full-thickness distal biceps tendon tear  with complete disruption of  both the long and short head attachments. Extensively frayed tendon is  retracted approximately 6 cm from the insertion.    I have personally reviewed the examination and initial interpretation  and I agree with the findings.    LUPILLO BRO MD (Joe)         SYSTEM ID:  [id number]

## 2021-10-04 NOTE — TELEPHONE ENCOUNTER
Discussed with Dr. Vaughn.  Full thickness biceps tendon tear.  Referral to orthopedic surgery.     Orders pended  Bruna Abraham MS ATC

## 2021-10-07 ENCOUNTER — TELEPHONE (OUTPATIENT)
Dept: ORTHOPEDICS | Facility: CLINIC | Age: 51
End: 2021-10-07

## 2021-10-07 ENCOUNTER — OFFICE VISIT (OUTPATIENT)
Dept: ORTHOPEDICS | Facility: CLINIC | Age: 51
End: 2021-10-07
Attending: PEDIATRICS
Payer: COMMERCIAL

## 2021-10-07 VITALS
WEIGHT: 235.1 LBS | DIASTOLIC BLOOD PRESSURE: 83 MMHG | SYSTOLIC BLOOD PRESSURE: 124 MMHG | HEIGHT: 72 IN | BODY MASS INDEX: 31.84 KG/M2

## 2021-10-07 DIAGNOSIS — S59.902A ELBOW INJURY, LEFT, INITIAL ENCOUNTER: ICD-10-CM

## 2021-10-07 DIAGNOSIS — S46.212A RUPTURE OF DISTAL BICEPS TENDON, LEFT, INITIAL ENCOUNTER: Primary | ICD-10-CM

## 2021-10-07 PROCEDURE — 99204 OFFICE O/P NEW MOD 45 MIN: CPT | Performed by: ORTHOPAEDIC SURGERY

## 2021-10-07 RX ORDER — IBUPROFEN 200 MG
200 TABLET ORAL EVERY 4 HOURS PRN
COMMUNITY
End: 2021-10-11

## 2021-10-07 ASSESSMENT — MIFFLIN-ST. JEOR: SCORE: 1959.41

## 2021-10-07 ASSESSMENT — PAIN SCALES - GENERAL: PAINLEVEL: NO PAIN (1)

## 2021-10-07 NOTE — PROGRESS NOTES
Chief Complaint:   Chief Complaint   Patient presents with     Left Elbow - Pain     Distal biceps rupture. DOI 9/23/21, 2 wk s/p. Patient notes he was camping and tried to slip on his shoes and lost his balance and put his arms out to catch himself. He got a sharp pain in the inner elbow and a divot in his biceps.      Elbow Pain     As long as he moves his arm slowly he is ok. If he tries to stretch or lift he will have increased pain.      Ata Garcia is seen today in the [facility] for evaluation of left distal biceps tendon rupture at the request of Dr. Priyank Vaughn       HPI: Ata Garcia is a 51 year old male , right -hand dominant, who presents for evaluation and management of a left elbow injury. The injury occurred on 9/23/2021 while camping, was slipping on his shoes and lost his balance, putting out his arms to catch himself. Onset of sharp pain in the elbow and noticed deformity of the biceps. As long as she doesn't move the arm too much, is doing ok, but if stretches it quickely, sharp pain. It has been 2 weeks since the initial injury. Was seen in Sports Medicine, referred for an MRI showing distal biceps rupture. Presents today for discussion of management.    Treatment with ice, rest, activity modification. Takes ibuprofen at night, but more as a routine longterm not for the elbow itself.    Still continues with day to day activities but favors the left arm. Some irritation posterior elbow since onset.      He reports having mild pain/discomfort around the injury site. He denies numbness or tingling.   Pain severity: 1/10  Pain quality: sharp  Frequency of symptoms: occasionally  Associated symptoms: posterior elbow discomfort.  Aggravated by: stretching, reaching  Relieved by: rest, relaxing.    Treatment up to this point:Rest, ice.    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 He also has no past medical history of Amblyopia, Arthritis, Cataract, Diabetes mellitus (H), Diabetic retinopathy (H), Glaucoma, Glaucoma (increased eye pressure), Hypertension, Macular degeneration, Nonsenile cataract, Retinal detachment, Senile macular degeneration, Strabismus, Type 2 diabetes mellitus without complications (H), or Uveitis.   Patient Active Problem List   Diagnosis     CARDIOVASCULAR SCREENING; LDL GOAL LESS THAN 160     Ocular hypertension     Obesity, Class I, BMI 30-34.9       Past surgical history:  has a past surgical history that includes vasectomy (2002); REMOVAL GALLBLADDER (10/2004); tonsillectomy & adenoidectomy (2001); Uvulectomy (2001); and Colonoscopy with CO2 insufflation (N/A, 10/5/2016).     Medications:   Current Outpatient Medications:      diphenhydrAMINE HCl (BENADRYL ALLERGY PO), , Disp: , Rfl:      ibuprofen (ADVIL/MOTRIN) 200 MG tablet, Take 200 mg by mouth every 4 hours as needed for mild pain, Disp: , Rfl:      fexofenadine (ALLEGRA) 180 MG tablet, Take 180 mg by mouth daily (Patient not taking: Reported on 9/24/2021), Disp: , Rfl:      sildenafil (VIAGRA) 100 MG tablet, TAKE ONE-HALF TO ONE TABLET BY MOUTH 30 MINUTES TO 4 HOURS BEFORE SEXUAL ACTIVITY (Patient not taking: Reported on 9/24/2021), Disp: 20 tablet, Rfl: 3    Allergies:   No Known Allergies     Family History: family history includes Breast Cancer in his sister; Cancer in his sister; Diabetes in his father and maternal grandfather; Lymphoma in his father.     Social History:  (IT).   reports that he has never smoked. He has never used smokeless tobacco. He reports current alcohol use. He reports that he does not use drugs.    Review of Systems:  ROS: 10 point ROS neg other than  "the symptoms noted above in the HPI and past medical history.    Physical Exam  GENERAL APPEARANCE: healthy, alert, no distress.   SKIN: no suspicious lesions or rashes  RESPIRATORY: No increased work of breathing.  NEURO: Normal strength and tone, mentation intact and speech normal  PSYCH:  mentation appears normal and affect normal. Not anxious.  Hands: no clubbing, nail pitting or nodes.    /83   Ht 1.829 m (6')   Wt 106.6 kg (235 lb 1.6 oz)   BMI 31.89 kg/m       left  UPPER EXTREMITY:    Sensation intact to light touch in median, radial, ulnar and axillary nerve distributions  Palpable 2+ radial pulse, brisk capillary refill to all fingers, wwp  Intact epl fpl fdp edc wrist flexion/extension biceps triceps deltoid    ELBOW:  Skin intact. No open wounds. No skin maceration.  Inspection: obvious distal biceps deformity and biceps muscle deformity, proximal \"ulises\".  Tender: distal bicep tendon and antecubital fossa.  Distal biceps tendon is palpable and mobile proximal to elbow crease  Non-tender: lateral epicondyle, medial epicondyle, supracondylar notch, olecranon bursa and radial head/neck  Range of Motion: all normal  Strength: decreased elbow flexion and supination.  There is biceps deformity in the area.      X-rays:  2 views left elbow from 10/7/2021 were reviewed in clinic today.  No obvious fracture or dislocation. No obvious bony abnormality or lesion.     MRI left elbow 10/4/2021:  Full-thickness distal biceps tendon tear with complete disruption of both the long and short head attachments. Extensively frayed tendon is  retracted approximately 6 cm from the insertion.Full-thickness distal biceps tendon tear with complete disruption of both the long and short head attachments. Extensively frayed tendon is retracted approximately 6 cm from the insertion.      Assessment: 51 year old male , right -hand dominant, with acute left distal biceps tendon rupture.    Plan:   * reviewed examination and " imaging studies with patient. Distal biceps rupture. Treatment options discussed. nonop with rest, then Physical Therapy for range of motion and strengthening, versus surgical repair.  * risks of nonop treatment include: weakness, pain, cramping, deformity.  * risks of surgery include: bleeding, infection, pain, scar, damage to adjacent structures (nerves, vessels) with either temporary versus permanent nerve injury, numbness/tingling, weakness, failure to relieve symptoms, recurrence, inability to regain full range of motion or strength, need for further surgery, risks of anesthesia.  * biceps provides supination and elbow flexion. Without surgery, can expect at least 30% loss of elbow flexion strength, 40% supination strength. With surgery, expect to get back 80-90%, ideally more.  * postoperatively, will be immobilized in a splint, then a hinged brace with gentle return to passive motion. Will not do active range of motion until healed.  * do expect weakness, stiffness after surgery  * there will be no use of left upper extremity, likely 2-3 months.  * plan: LEFT elbow distal biceps repair, outpatient surgery.  * oral pain medications postoperative to be provided  * will be in a splint postoperatively  * needs H+P from primary care provider.  * patient is vaccinated so should not need covid test at [facility].  * return to clinic 2 weeks postoperative for wound check, suture removal, sooner if needed. Plan hinged brace at that time.  * all questions addressed and answered today.      Ramón Delgado M.D., M.S.  Dept. of Orthopaedic Surgery  [facility]

## 2021-10-07 NOTE — LETTER
10/7/2021         RE: Ata Garcia  [address] [facility] [address]        Dear Colleague,    Thank you for referring your patient, Ata Garcia, to the [facility] [place]. Please see a copy of my visit note below.    Chief Complaint:   Chief Complaint   Patient presents with     Left Elbow - Pain     Distal biceps rupture. DOI 9/23/21, 2 wk s/p. Patient notes he was camping and tried to slip on his shoes and lost his balance and put his arms out to catch himself. He got a sharp pain in the inner elbow and a divot in his biceps.      Elbow Pain     As long as he moves his arm slowly he is ok. If he tries to stretch or lift he will have increased pain.      Ata Garcia is seen today in the [facility] for evaluation of left distal biceps tendon rupture at the [facility]       HPI: Ata Garcia is a 51 year old male , right -hand dominant, who presents for evaluation and management of a left elbow injury. The injury occurred on 9/23/2021 while camping, was slipping on his shoes and lost his balance, putting out his arms to catch himself. Onset of sharp pain in the elbow and noticed deformity of the biceps. As long as she doesn't move the arm too much, is doing ok, but if stretches it quickely, sharp pain. It has been 2 weeks since the initial injury. Was seen in Sports Medicine, referred for an MRI showing distal biceps rupture. Presents today for discussion of management.    Treatment with ice, rest, activity modification. Takes ibuprofen at night, but more as a routine longterm not for the elbow itself.    Still continues with day to day activities but favors the left arm. Some irritation posterior elbow since onset.      He reports having mild pain/discomfort around the injury site. He denies numbness or tingling.   Pain severity: 1/10  Pain quality: sharp  Frequency of symptoms: occasionally  Associated  symptoms: posterior elbow discomfort.  Aggravated by: stretching, reaching  Relieved by: rest, relaxing.    Treatment up to this point:Rest, ice.    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 He also has no past medical history of Amblyopia, Arthritis, Cataract, Diabetes mellitus (H), Diabetic retinopathy (H), Glaucoma, Glaucoma (increased eye pressure), Hypertension, Macular degeneration, Nonsenile cataract, Retinal detachment, Senile macular degeneration, Strabismus, Type 2 diabetes mellitus without complications (H), or Uveitis.   Patient Active Problem List   Diagnosis     CARDIOVASCULAR SCREENING; LDL GOAL LESS THAN 160     Ocular hypertension     Obesity, Class I, BMI 30-34.9       Past surgical history:  has a past surgical history that includes vasectomy (2002); REMOVAL GALLBLADDER (10/2004); tonsillectomy & adenoidectomy (2001); Uvulectomy (2001); and Colonoscopy with CO2 insufflation (N/A, 10/5/2016).     Medications:   Current Outpatient Medications:      diphenhydrAMINE HCl (BENADRYL ALLERGY PO), , Disp: , Rfl:      ibuprofen (ADVIL/MOTRIN) 200 MG tablet, Take 200 mg by mouth every 4 hours as needed for mild pain, Disp: , Rfl:      fexofenadine (ALLEGRA) 180 MG tablet, Take 180 mg by mouth daily (Patient not taking: Reported on 9/24/2021), Disp: , Rfl:      sildenafil (VIAGRA) 100 MG tablet, TAKE ONE-HALF TO ONE TABLET BY MOUTH 30 MINUTES TO 4 HOURS BEFORE SEXUAL ACTIVITY (Patient not taking: Reported on 9/24/2021), Disp: 20 tablet, Rfl: 3    Allergies:   No Known Allergies     Family History: family history includes Breast Cancer in his sister; Cancer in his sister; Diabetes in his father and maternal grandfather;  "Lymphoma in his father.     Social History: systems engineer (IT).   reports that he has never smoked. He has never used smokeless tobacco. He reports current alcohol use. He reports that he does not use drugs.    Review of Systems:  ROS: 10 point ROS neg other than the symptoms noted above in the HPI and past medical history.    Physical Exam  GENERAL APPEARANCE: healthy, alert, no distress.   SKIN: no suspicious lesions or rashes  RESPIRATORY: No increased work of breathing.  NEURO: Normal strength and tone, mentation intact and speech normal  PSYCH:  mentation appears normal and affect normal. Not anxious.  Hands: no clubbing, nail pitting or nodes.    /83   Ht 1.829 m (6')   Wt 106.6 kg (235 lb 1.6 oz)   BMI 31.89 kg/m       left  UPPER EXTREMITY:    Sensation intact to light touch in median, radial, ulnar and axillary nerve distributions  Palpable 2+ radial pulse, brisk capillary refill to all fingers, wwp  Intact epl fpl fdp edc wrist flexion/extension biceps triceps deltoid    ELBOW:  Skin intact. No open wounds. No skin maceration.  Inspection: obvious distal biceps deformity and biceps muscle deformity, proximal \"ulises\".  Tender: distal bicep tendon and antecubital fossa.  Distal biceps tendon is palpable and mobile proximal to elbow crease  Non-tender: lateral epicondyle, medial epicondyle, supracondylar notch, olecranon bursa and radial head/neck  Range of Motion: all normal  Strength: decreased elbow flexion and supination.  There is biceps deformity in the area.      X-rays:  2 views left elbow from 10/7/2021 were reviewed in clinic today.  No obvious fracture or dislocation. No obvious bony abnormality or lesion.     MRI left elbow 10/4/2021:  Full-thickness distal biceps tendon tear with complete disruption of both the long and short head attachments. Extensively frayed tendon is  retracted approximately 6 cm from the insertion.Full-thickness distal biceps tendon tear with complete " disruption of both the long and short head attachments. Extensively frayed tendon is retracted approximately 6 cm from the insertion.      Assessment: 51 year old male , right -hand dominant, with acute left distal biceps tendon rupture.    Plan:   * reviewed examination and imaging studies with patient. Distal biceps rupture. Treatment options discussed. nonop with rest, then Physical Therapy for range of motion and strengthening, versus surgical repair.  * risks of nonop treatment include: weakness, pain, cramping, deformity.  * risks of surgery include: bleeding, infection, pain, scar, damage to adjacent structures (nerves, vessels) with either temporary versus permanent nerve injury, numbness/tingling, weakness, failure to relieve symptoms, recurrence, inability to regain full range of motion or strength, need for further surgery, risks of anesthesia.  * biceps provides supination and elbow flexion. Without surgery, can expect at least 30% loss of elbow flexion strength, 40% supination strength. With surgery, expect to get back 80-90%, ideally more.  * postoperatively, will be immobilized in a splint, then a hinged brace with gentle return to passive motion. Will not do active range of motion until healed.  * do expect weakness, stiffness after surgery  * there will be no use of left upper extremity, likely 2-3 months.  * plan: LEFT elbow distal biceps repair, outpatient surgery.  * oral pain medications postoperative to be provided  * will be in a splint postoperatively  * needs H+P from primary care provider.  * patient is vaccinated so should not need covid test at [facility].  * return to clinic 2 weeks postoperative for wound check, suture removal, sooner if needed. Plan hinged brace at that time.  * all questions addressed and answered today.      Ramón Delgado M.D., M.S.  Dept. of Orthopaedic Surgery  [facility]      Again, thank you for allowing me to participate in the care of  your patient.        Sincerely,        Ramón Delgado MD

## 2021-10-07 NOTE — TELEPHONE ENCOUNTER
Type of surgery: left distal biceps repair  CPT 86632  Rupture of distal biceps tendon, left, initial encounter S46.212A     Elbow injury, left, initial encounter S59.902A    Location of surgery: [facility]  Date and time of surgery: 10-12-21   11:00am  Surgeon: Dr Delgado  Pre-Op Appt Date: 10-11-21  Post-Op Appt Date: 10-27-21   Packet sent out: No  Pre-cert/Authorization completed: No prior auth needed for [employer] per online list.     Date: 10/8/21    Latonia Dominguez  Prior Authorization Dept  [phone]

## 2021-10-11 ENCOUNTER — OFFICE VISIT (OUTPATIENT)
Dept: FAMILY MEDICINE | Facility: CLINIC | Age: 51
End: 2021-10-11
Payer: COMMERCIAL

## 2021-10-11 VITALS
OXYGEN SATURATION: 100 % | HEART RATE: 67 BPM | DIASTOLIC BLOOD PRESSURE: 88 MMHG | SYSTOLIC BLOOD PRESSURE: 138 MMHG | TEMPERATURE: 98.1 F | BODY MASS INDEX: 32.22 KG/M2 | RESPIRATION RATE: 16 BRPM | WEIGHT: 237.6 LBS

## 2021-10-11 DIAGNOSIS — Z01.818 PREOP GENERAL PHYSICAL EXAM: Primary | ICD-10-CM

## 2021-10-11 DIAGNOSIS — S46.212A RUPTURE OF DISTAL BICEPS TENDON, LEFT, INITIAL ENCOUNTER: ICD-10-CM

## 2021-10-11 LAB
ERYTHROCYTE [DISTWIDTH] IN BLOOD BY AUTOMATED COUNT: 13.5 % (ref 10–15)
HCT VFR BLD AUTO: 46.7 % (ref 40–53)
HGB BLD-MCNC: 16 G/DL (ref 13.3–17.7)
MCH RBC QN AUTO: 29.2 PG (ref 26.5–33)
MCHC RBC AUTO-ENTMCNC: 34.3 G/DL (ref 31.5–36.5)
MCV RBC AUTO: 85 FL (ref 78–100)
PLATELET # BLD AUTO: 192 10E3/UL (ref 150–450)
RBC # BLD AUTO: 5.48 10E6/UL (ref 4.4–5.9)
WBC # BLD AUTO: 7.7 10E3/UL (ref 4–11)

## 2021-10-11 PROCEDURE — 99214 OFFICE O/P EST MOD 30 MIN: CPT | Performed by: PHYSICIAN ASSISTANT

## 2021-10-11 PROCEDURE — 85027 COMPLETE CBC AUTOMATED: CPT | Performed by: PHYSICIAN ASSISTANT

## 2021-10-11 PROCEDURE — 36415 COLL VENOUS BLD VENIPUNCTURE: CPT | Performed by: PHYSICIAN ASSISTANT

## 2021-10-11 NOTE — PATIENT INSTRUCTIONS

## 2021-10-11 NOTE — PROGRESS NOTES
[facility]  [address] [place] [address]  [place] [address]  Phone: [phone]  Primary Provider: Michelle Ref-Primary, Physician  Pre-op Performing Provider: MARY NASCIMENTO      PREOPERATIVE EVALUATION:  Today's date: 10/11/2021    Ata Garcia is a 51 year old male who presents for a preoperative evaluation.    Surgical Information:  Surgery/Procedure: left distal biceps repair  [place]  Surgeon: Danny  Surgery Date: 10/12/2021  Time of Surgery: 7 am  Where patient plans to recover: At home with family  Fax number for surgical facility: [phone].    Type of Anesthesia Anticipated: to be determined    Assessment & Plan     The proposed surgical procedure is considered INTERMEDIATE risk.    Preop general physical exam  - CBC with platelets  Rupture of distal biceps tendon, left, initial encounter      Risks and Recommendations:  The patient has the following additional risks and recommendations for perioperative complications:  Obstructive Sleep Apnea:   Patient instructed to bring CPAP with to surgery    Medication Instructions:  Patient is on no chronic medications    RECOMMENDATION:  APPROVAL GIVEN to proceed with proposed procedure, without further diagnostic evaluation.                      Subjective     HPI related to upcoming procedure: Fell while putting shoe on, fell onto arm. Had rupture of distal biceps tendon      Preop Questions 10/11/2021   1. Have you ever had a heart attack or stroke? No   2. Have you ever had surgery on your heart or blood vessels, such as a stent placement, a coronary artery bypass, or surgery on an artery in your head, neck, heart, or legs? No   3. Do you have chest pain with activity? No   4. Do you have a history of  heart failure? No   5. Do you currently have a cold, bronchitis or symptoms of other infection? No   6. Do you have a cough, shortness of breath, or wheezing? No   7. Do you or anyone in your family have  previous history of blood clots? No   8. Do you or does anyone in your family have a serious bleeding problem such as prolonged bleeding following surgeries or cuts? No   9. Have you ever had problems with anemia or been told to take iron pills? No   10. Have you had any abnormal blood loss such as black, tarry or bloody stools? No   11. Have you ever had a blood transfusion? No   12. Are you willing to have a blood transfusion if it is medically needed before, during, or after your surgery? Yes   13. Have you or any of your relatives ever had problems with anesthesia? No   14. Do you have sleep apnea, excessive snoring or daytime drowsiness? YES - Has a CPAP   14a. Do you have a CPAP machine? Yes   15. Do you have any artifical heart valves or other implanted medical devices like a pacemaker, defibrillator, or continuous glucose monitor? No   16. Do you have artificial joints? No   17. Are you allergic to latex? No     Health Care Directive:  Patient does not have a Health Care Directive or Living Will: Discussed advance care planning with patient; however, patient declined at this time.    Preoperative Review of :   reviewed - no record of controlled substances prescribed.      Review of Systems  CONSTITUTIONAL: NEGATIVE for fever, chills, change in weight  INTEGUMENTARY/SKIN: NEGATIVE for worrisome rashes, moles or lesions  EYES: NEGATIVE for vision changes or irritation  ENT/MOUTH: NEGATIVE for ear, mouth and throat problems  RESP: NEGATIVE for significant cough or SOB  CV: NEGATIVE for chest pain, palpitations or peripheral edema  GI: NEGATIVE for nausea, abdominal pain, heartburn, or change in bowel habits  : NEGATIVE for frequency, dysuria, or hematuria  MUSCULOSKELETAL: NEGATIVE for significant arthralgias or myalgia  NEURO: NEGATIVE for weakness, dizziness or paresthesias  ENDOCRINE: NEGATIVE for temperature intolerance, skin/hair changes  HEME: NEGATIVE for bleeding problems  PSYCHIATRIC: NEGATIVE  for changes in mood or affect    Patient Active Problem List    Diagnosis Date Noted     Obesity, Class I, BMI 30-34.9 09/13/2016     Priority: Medium     Ocular hypertension 10/24/2011     Priority: Medium     Target IOP:    Peak IOP: 30  GDX: prob nl  HVF:    Pachymetry: thick  C/D: 0.40.4  SLT:           CARDIOVASCULAR SCREENING; LDL GOAL LESS THAN 160 10/31/2010     Priority: Medium      Past Medical History:   Diagnosis Date     Hypercholesterolemia      RAJIV (obstructive sleep apnea)     treated surgically     Prostatitis      Past Surgical History:   Procedure Laterality Date     COLONOSCOPY WITH CO2 INSUFFLATION N/A 10/5/2016    Procedure: COLONOSCOPY WITH CO2 INSUFFLATION;  Surgeon: Glenn Joiner DO;  Location: [facility] OR     HC REMOVAL GALLBLADDER  10/2004     TONSILLECTOMY & ADENOIDECTOMY  2001    for RAJIV     UVULECTOMY  2001    for RAJIV     VASECTOMY  2002     No current outpatient medications on file.       No Known Allergies     Social History     Tobacco Use     Smoking status: Never Smoker     Smokeless tobacco: Never Used   Substance Use Topics     Alcohol use: Yes     Comment: occasionally       History   Drug Use No         Objective     /88   Pulse 67   Temp 98.1  F (36.7  C) (Oral)   Resp 16   Wt 107.8 kg (237 lb 9.6 oz)   SpO2 100%   BMI 32.22 kg/m      Physical Exam    GENERAL APPEARANCE: healthy, alert and no distress     EYES: EOMI,  PERRL     HENT: ear canals and TM's normal and nose and mouth without ulcers or lesions     NECK: no adenopathy, no asymmetry, masses, or scars and thyroid normal to palpation     RESP: lungs clear to auscultation - no rales, rhonchi or wheezes     CV: regular rates and rhythm, normal S1 S2, no S3 or S4 and no murmur, click or rub     ABDOMEN:  soft, nontender, no HSM or masses and bowel sounds normal     MS: extremities normal- no gross deformities noted, no evidence of inflammation in joints, FROM in all extremities.     SKIN: no suspicious lesions or  rashes     NEURO: Normal strength and tone, sensory exam grossly normal, mentation intact and speech normal     PSYCH: mentation appears normal. and affect normal/bright     LYMPHATICS: No cervical adenopathy    Recent Labs   Lab Test 10/07/20  1150   HGB 18.3*         POTASSIUM 4.2   CR 0.96        Diagnostics:  Recent Results (from the past 24 hour(s))   CBC with platelets    Collection Time: 10/11/21 10:36 AM   Result Value Ref Range    WBC Count 7.7 4.0 - 11.0 10e3/uL    RBC Count 5.48 4.40 - 5.90 10e6/uL    Hemoglobin 16.0 13.3 - 17.7 g/dL    Hematocrit 46.7 40.0 - 53.0 %    MCV 85 78 - 100 fL    MCH 29.2 26.5 - 33.0 pg    MCHC 34.3 31.5 - 36.5 g/dL    RDW 13.5 10.0 - 15.0 %    Platelet Count 192 150 - 450 10e3/uL      No EKG required, no history of coronary heart disease, significant arrhythmia, peripheral arterial disease or other structural heart disease.    Revised Cardiac Risk Index (RCRI):  The patient has the following serious cardiovascular risks for perioperative complications:   - No serious cardiac risks = 0 points     RCRI Interpretation: 0 points: Class I (very low risk - 0.4% complication rate)           Signed Electronically by: Sherry Denney PA-C  Copy of this evaluation report is provided to requesting physician.

## 2021-10-27 ENCOUNTER — OFFICE VISIT (OUTPATIENT)
Dept: ORTHOPEDICS | Facility: CLINIC | Age: 51
End: 2021-10-27
Payer: COMMERCIAL

## 2021-10-27 VITALS
BODY MASS INDEX: 31.71 KG/M2 | DIASTOLIC BLOOD PRESSURE: 87 MMHG | SYSTOLIC BLOOD PRESSURE: 136 MMHG | HEIGHT: 72 IN | HEART RATE: 60 BPM | WEIGHT: 234.1 LBS

## 2021-10-27 DIAGNOSIS — S46.212D RUPTURE OF DISTAL BICEPS TENDON, LEFT, SUBSEQUENT ENCOUNTER: Primary | ICD-10-CM

## 2021-10-27 PROCEDURE — 29105 APPLICATION LONG ARM SPLINT: CPT | Mod: 58 | Performed by: ORTHOPAEDIC SURGERY

## 2021-10-27 PROCEDURE — 99024 POSTOP FOLLOW-UP VISIT: CPT | Performed by: ORTHOPAEDIC SURGERY

## 2021-10-27 RX ORDER — ACETAMINOPHEN 325 MG/1
650 TABLET ORAL
COMMUNITY

## 2021-10-27 RX ORDER — METHOCARBAMOL 500 MG/1
1000 TABLET, FILM COATED ORAL
COMMUNITY
Start: 2021-10-12 | End: 2022-12-07

## 2021-10-27 RX ORDER — OXYCODONE HYDROCHLORIDE 5 MG/1
5 TABLET ORAL EVERY 12 HOURS PRN
Qty: 8 TABLET | Refills: 0 | Status: SHIPPED | OUTPATIENT
Start: 2021-10-27 | End: 2021-10-31

## 2021-10-27 RX ORDER — IBUPROFEN 200 MG
200 TABLET ORAL EVERY 4 HOURS PRN
COMMUNITY

## 2021-10-27 ASSESSMENT — PAIN SCALES - GENERAL: PAINLEVEL: NO PAIN (0)

## 2021-10-27 ASSESSMENT — MIFFLIN-ST. JEOR: SCORE: 1954.87

## 2021-10-27 NOTE — LETTER
10/27/2021         RE: Ata Garcia  [address] [facility] [address]        Dear Colleague,    Thank you for referring your patient, Ata Garcia, to the [facility] [place]. Please see a copy of my visit note below.    CHIEF COMPLAINT:   Chief Complaint   Patient presents with     Left Elbow - Surgical Followup     Distal biceps repair. DOS 10/12/21, 2 wk s/p. Patient notes his elbow is fine. He denies any pain today. He just has to make sure it is in a comfortable position. He is out of oxycodone.          SURGICAL PROCEDURE: left distal biceps tendon repair ([facility])  DATE OF PROCEDURE: 10/12/2021      HISTORY OF PRESENT ILLNESS    Ata Garcia is a 51 year old male seen for postoperative follow-up of a left distal biceps repair that occurred 2 weeks ago. Since surgery, pain has been improving, not much pain today, but is positional. Denies fevers, chills, night sweats. No wound problems. Compliant with weight bearing restrictions and elevation. There have been no issues since surgery. He is out of oxycodone the past few days, thought he could go without at night, but has had difficulties sleeping. The methocarbamol makes him feel weird.        Other PMH:  has a past medical history of Hypercholesterolemia, RAJIV (obstructive sleep apnea), and Prostatitis. He also has no past medical history of Amblyopia, Arthritis, Cataract, Diabetes mellitus (H), Diabetic retinopathy (H), Glaucoma, Glaucoma (increased eye pressure), Hypertension, Macular degeneration, Nonsenile cataract, Retinal detachment, Senile macular degeneration, Strabismus, Type 2 diabetes mellitus without complications (H), or Uveitis.  Patient Active Problem List   Diagnosis     CARDIOVASCULAR SCREENING; LDL GOAL LESS THAN 160     Ocular hypertension     Obesity, Class I, BMI 30-34.9       Past surgical History:  has a past surgical history that includes vasectomy (2002); REMOVAL GALLBLADDER  (10/2004); tonsillectomy & adenoidectomy (2001); Uvulectomy (2001); and Colonoscopy with CO2 insufflation (N/A, 10/5/2016).    Medications: No current outpatient medications on file.    Allergies: No Known Allergies    REVIEW OF SYSTEMS:  CONSTITUTIONAL:NEGATIVE for fever, chills, change in weight  INTEGUMENTARY/SKIN: NEGATIVE for worrisome rashes, moles or lesions  MUSCULOSKELETAL:See HPI above  NEURO: NEGATIVE for weakness, dizziness or paresthesias      PHYSICAL EXAM:  /87   Pulse 60   Ht 1.829 m (6')   Wt 106.2 kg (234 lb 1.6 oz)   BMI 31.75 kg/m     GENERAL APPEARANCE: healthy, alert, no distress   SKIN: no suspicious lesions or rashes  NEURO: Normal strength and tone, mentation intact and speech normal  PSYCH:  mentation appears normal and affect normal  RESPIRATORY: No increased work of breathing.      left UPPER EXTREMITY:  Wound / Incision: healing well, suture in place.  Inspection: mild swelling, resolving ecchymosis  Palpation: tender to palpation over distal biceps, anterior elbow.  Strength: grossly intact.  Biceps tendon feels intact, taut, tender.  Sensation intact to light touch in median, radial, ulnar and axillary nerve distributions  Palpable 2+ radial pulse, brisk capillary refill to all fingers, wwp  Intact epl fpl fdp edc wrist flexion/extension biceps triceps deltoid          X-RAY:  none indicated.          Impression: 51 year old male  2 months  postoperative open repair left distal biceps, doing well.    Plan:   * suture removal today.  Weight bearing status: non weight bearing, strict  Pain control: over the counter as needed.  Immobilization: hinged elbow brace, will start 60-full flexion, then gradually increase extension 20 degrees every couple weeks until full  Will refer to orthotics for hinged elbow brace  Hand therapy for active elbow extension, passive flexion in brace with set parameters as above  Elevation of affected extremity  Oxycodone 5mg q12h as needed pain,  escribed to [facility]. Wean off to only over the counter pain medications.  Images: none  Return to clinic in 4 weeks, or sooner as needed.      Ramón Delgado M.D., M.S.  Dept. of Orthopaedic Surgery  [facility]    Cast/splint application    Date/Time: 10/27/2021 8:41 AM  Performed by: Marisabel Mock, VAL  Authorized by: Ramón Delgado MD     Consent:     Consent obtained:  Verbal    Consent given by:  Patient    Risks discussed:  Discoloration, numbness, pain and swelling  Pre-procedure details:     Sensation:  Normal  Procedure details:     Laterality:  Left    Location:  Elbow    Elbow:  L elbow    Splint type:  Long arm    Supplies used: Orthoglass.  Post-procedure details:     Pain:  Unchanged    Sensation:  Normal    Patient tolerance of procedure:  Tolerated well, no immediate complications            Again, thank you for allowing me to participate in the care of your patient.        Sincerely,        Ramón Delgado MD

## 2021-10-27 NOTE — PATIENT INSTRUCTIONS
Please schedule a followup visit with Dr Delgado in 4 weeks.  Please make an appointment with orthotics as soon as you can to get hinged elbow brace  Please make an appointment with hand therapy to start elbow range of motion once you get the brace.  No lifting or using the left arm  Ok for gentle range of motion of the elbow, 60 degrees to full flexion (passive flexion, active extension IN BRACE)    Wean off pain medications as able.  Over the counter pain medications is preferred at this time

## 2021-10-27 NOTE — PROGRESS NOTES
CHIEF COMPLAINT:   Chief Complaint   Patient presents with     Left Elbow - Surgical Followup     Distal biceps repair. DOS 10/12/21, 2 wk s/p. Patient notes his elbow is fine. He denies any pain today. He just has to make sure it is in a comfortable position. He is out of oxycodone.          SURGICAL PROCEDURE: left distal biceps tendon repair ([facility])  DATE OF PROCEDURE: 10/12/2021      HISTORY OF PRESENT ILLNESS    Ata Garcia is a 51 year old male seen for postoperative follow-up of a left distal biceps repair that occurred 2 weeks ago. Since surgery, pain has been improving, not much pain today, but is positional. Denies fevers, chills, night sweats. No wound problems. Compliant with weight bearing restrictions and elevation. There have been no issues since surgery. He is out of oxycodone the past few days, thought he could go without at night, but has had difficulties sleeping. The methocarbamol makes him feel weird.        Other PMH:  has a past medical history of Hypercholesterolemia, RAJIV (obstructive sleep apnea), and Prostatitis. He also has no past medical history of Amblyopia, Arthritis, Cataract, Diabetes mellitus (H), Diabetic retinopathy (H), Glaucoma, Glaucoma (increased eye pressure), Hypertension, Macular degeneration, Nonsenile cataract, Retinal detachment, Senile macular degeneration, Strabismus, Type 2 diabetes mellitus without complications (H), or Uveitis.  Patient Active Problem List   Diagnosis     CARDIOVASCULAR SCREENING; LDL GOAL LESS THAN 160     Ocular hypertension     Obesity, Class I, BMI 30-34.9       Past surgical History:  has a past surgical history that includes vasectomy (2002); REMOVAL GALLBLADDER (10/2004); tonsillectomy & adenoidectomy (2001); Uvulectomy (2001); and Colonoscopy with CO2 insufflation (N/A, 10/5/2016).    Medications: No current outpatient medications on file.    Allergies: No Known Allergies    REVIEW OF SYSTEMS:  CONSTITUTIONAL:NEGATIVE for  fever, chills, change in weight  INTEGUMENTARY/SKIN: NEGATIVE for worrisome rashes, moles or lesions  MUSCULOSKELETAL:See HPI above  NEURO: NEGATIVE for weakness, dizziness or paresthesias      PHYSICAL EXAM:  /87   Pulse 60   Ht 1.829 m (6')   Wt 106.2 kg (234 lb 1.6 oz)   BMI 31.75 kg/m     GENERAL APPEARANCE: healthy, alert, no distress   SKIN: no suspicious lesions or rashes  NEURO: Normal strength and tone, mentation intact and speech normal  PSYCH:  mentation appears normal and affect normal  RESPIRATORY: No increased work of breathing.      left UPPER EXTREMITY:  Wound / Incision: healing well, suture in place.  Inspection: mild swelling, resolving ecchymosis  Palpation: tender to palpation over distal biceps, anterior elbow.  Strength: grossly intact.  Biceps tendon feels intact, taut, tender.  Sensation intact to light touch in median, radial, ulnar and axillary nerve distributions  Palpable 2+ radial pulse, brisk capillary refill to all fingers, wwp  Intact epl fpl fdp edc wrist flexion/extension biceps triceps deltoid          X-RAY:  none indicated.          Impression: 51 year old male  2 months  postoperative open repair left distal biceps, doing well.    Plan:   * suture removal today.  Weight bearing status: non weight bearing, strict  Pain control: over the counter as needed.  Immobilization: hinged elbow brace, will start 60-full flexion, then gradually increase extension 20 degrees every couple weeks until full  Will refer to orthotics for hinged elbow brace  Hand therapy for active elbow extension, passive flexion in brace with set parameters as above  Elevation of affected extremity  Oxycodone 5mg q12h as needed pain, escribed to [facility]. Wean off to only over the counter pain medications.  Images: none  Return to clinic in 4 weeks, or sooner as needed.      Ramón Delgado M.D., M.S.  Dept. of Orthopaedic Surgery  [facility]    Cast/splint  application    Date/Time: 10/27/2021 8:41 AM  Performed by: Marisabel Mock ATC  Authorized by: Ramón Delgado MD     Consent:     Consent obtained:  Verbal    Consent given by:  Patient    Risks discussed:  Discoloration, numbness, pain and swelling  Pre-procedure details:     Sensation:  Normal  Procedure details:     Laterality:  Left    Location:  Elbow    Elbow:  L elbow    Splint type:  Long arm    Supplies used: [employer].  Post-procedure details:     Pain:  Unchanged    Sensation:  Normal    Patient tolerance of procedure:  Tolerated well, no immediate complications

## 2021-10-29 ENCOUNTER — THERAPY VISIT (OUTPATIENT)
Dept: PHYSICAL THERAPY | Facility: CLINIC | Age: 51
End: 2021-10-29
Attending: ORTHOPAEDIC SURGERY
Payer: COMMERCIAL

## 2021-10-29 DIAGNOSIS — S46.212D RUPTURE OF DISTAL BICEPS TENDON, LEFT, SUBSEQUENT ENCOUNTER: Primary | ICD-10-CM

## 2021-10-29 PROCEDURE — 97161 PT EVAL LOW COMPLEX 20 MIN: CPT | Mod: GP | Performed by: PHYSICAL THERAPIST

## 2021-10-29 PROCEDURE — 97110 THERAPEUTIC EXERCISES: CPT | Mod: GP | Performed by: PHYSICAL THERAPIST

## 2021-10-29 NOTE — PROGRESS NOTES
Physical Therapy Initial Evaluation  Subjective:    Patient Health History  Ata Garcia being seen for L bicep tendon repair.     Problem began: 2021.   Problem occurred: fall   Pain is reported as 0/10 on pain scale.  General health as reported by patient is good.  Pertinent medical history includes: high blood pressure, diabetes and sleep disorder/apnea (prostatitis, glaucoma).   Red flags:  None as reported by patient.  Medical allergies: none.   Surgeries include:  Orthopedic surgery. Other surgery history details: bicep tendon repair.    Current medications:  Pain medication.    Current occupation is .   Primary job tasks include:  Computer work and lifting/carrying.                              Pt fell while camping resulting in severe L elbow pain and was found to have a torn distal bicep tendon, he is now s/p L distal bicep tendon repair by Dr. Delgado on 10/12/21.    Objective:        ELBOW:   PROM L PROM R AROM L AROM R MMT L MMT R   Flex 126 145  130     Ext 60(per restriction) 0  5     Hyper Ext nt   0     Pronation 52   90     Supination 40   90       SHOULDER:   PROM L PROM R AROM L AROM R MMT L MMT R   Flex   nt wnl nt 5/5   Abd   nt wnl nt 5/5   Full Can         Empty Can         IR   nt wnl nt 5/5   ER   nt wnl nt 5/5   Ext/IR             ELBOW/WRIST:  Palpation: TTP near L proximal forearm incision  Special Testing: deferred due to known surgery       strength R: 102, 116, 116 R av    L: 41, 48, 50 L av        System    Physical Exam    General     ROS    Assessment/Plan:    Patient is a 51 year old male with left side elbow complaints.    Patient has the following significant findings with corresponding treatment plan.                Diagnosis 1:  S/p L distal bicep tendon repair  Pain -  hot/cold therapy, US, electric stimulation, manual therapy, splint/taping/bracing/orthotics, education and home program  Decreased ROM/flexibility - manual therapy,  therapeutic exercise, therapeutic activity and home program  Decreased joint mobility - manual therapy, therapeutic exercise, therapeutic activity and home program  Decreased strength - therapeutic exercise, therapeutic activities and home program  Decreased function - therapeutic activities and home program    Cumulative Therapy Evaluation is: Low complexity.    Previous and current functional limitations:  (See Goal Flow Sheet for this information)    Short term and Long term goals: (See Goal Flow Sheet for this information)     Communication ability:  Patient appears to be able to clearly communicate and understand verbal and written communication and follow directions correctly.  Treatment Explanation - The following has been discussed with the patient:   RX ordered/plan of care  Anticipated outcomes  Possible risks and side effects  This patient would benefit from PT intervention to resume normal activities.   Rehab potential is good.    Frequency:  1 X week, once daily  Duration:  for 26 weeks  Discharge Plan:  Achieve all LTG.  Independent in home treatment program.  Reach maximal therapeutic benefit.    Please refer to the daily flowsheet for treatment today, total treatment time and time spent performing 1:1 timed codes.

## 2021-11-09 ENCOUNTER — THERAPY VISIT (OUTPATIENT)
Dept: PHYSICAL THERAPY | Facility: CLINIC | Age: 51
End: 2021-11-09
Payer: COMMERCIAL

## 2021-11-09 DIAGNOSIS — S46.212D RUPTURE OF DISTAL BICEPS TENDON, LEFT, SUBSEQUENT ENCOUNTER: Primary | ICD-10-CM

## 2021-11-09 PROCEDURE — 97110 THERAPEUTIC EXERCISES: CPT | Mod: GP | Performed by: PHYSICAL THERAPIST

## 2021-11-16 ENCOUNTER — THERAPY VISIT (OUTPATIENT)
Dept: PHYSICAL THERAPY | Facility: CLINIC | Age: 51
End: 2021-11-16
Payer: COMMERCIAL

## 2021-11-16 DIAGNOSIS — S46.212D RUPTURE OF DISTAL BICEPS TENDON, LEFT, SUBSEQUENT ENCOUNTER: Primary | ICD-10-CM

## 2021-11-16 PROCEDURE — 97110 THERAPEUTIC EXERCISES: CPT | Mod: GP | Performed by: PHYSICAL THERAPIST

## 2021-11-16 NOTE — PROGRESS NOTES
"Subjective:  HPI  Physical Exam                    Objective:  System    Physical Exam    General     ROS    Assessment/Plan:    PROGRESS  REPORT    Progress reporting period is from 10/29/21 to 11/16/21.     SUBJECTIVE  Subjective: pt reports he is feeling good   Current Pain level: 0/10   Initial Pain level: 8/10   The objective findings are from DOS 11/16/21.    OBJECTIVE  Objective: PROM L elbow 34degs flx or 146degs of extension. 67degs supination, 57degs pronation painfree.      ASSESSMENT/PLAN  Updated problem list and treatment plan: Diagnosis 1:  S/p L distal bicep repair  STG/LTGs have been met or progress has been made towards goals:  Yes, painfree motion  Assessment of Progress: The patient's condition is improving.  The patient's condition has potential to improve.  Self Management Plans:  Patient is independent in a home treatment program.  I have re-evaluated this patient and find that the nature, scope, duration and intensity of the therapy is appropriate for the medical condition of the patient.  Ata continues to require the following intervention to meet STG and LTG's: PT  The patient is returning to your office for a recheck appointment.    Recommendations:  Ata has been seen for 3 PT visits s/p L distal bicep tendon repair and is doing well. He has been improving painfree ROM as appropriate, however, at work and intermittently at home he has been using LUE more than strict NWB restrictions allow. Precautions have been reviewed each session with his verbal understanding, he is painfree during tasks where he \"uses\" his L arm to help. Biggest complaint is with brace and sleeping at night. Current plan is to continue PT for PROM until AROM and strengthening are allowed.    Please refer to the daily flowsheet for treatment today, total treatment time and time spent performing 1:1 timed codes.    "

## 2021-11-22 NOTE — PROGRESS NOTES
CHIEF COMPLAINT:   Chief Complaint   Patient presents with     Left Elbow - Surgical Followup     Distal biceps repair. DOS 10/12/21, 6 wk s/p. Patient notes his elbow is doing well today. He denies any pain today. He has received a hinged elbow brace.            SURGICAL PROCEDURE: left distal biceps tendon repair ([facility])  DATE OF PROCEDURE: 10/12/2021      HISTORY OF PRESENT ILLNESS    Ata Garcia is a 51 year old male seen for postoperative follow-up of a left distal biceps repair that occurred 6 weeks ago. Has been going to Physical Therapy, wearing hinged elbow brace working on expanding range of motion. No pain. Will have some soreness if he does too much, like opening car door with left arm.    Denies fevers, chills, night sweats. No wound problems. Compliant with weight bearing restrictions and elevation. There have been no issues since surgery.        Other PMH:  has a past medical history of Hypercholesterolemia, RAJIV (obstructive sleep apnea), and Prostatitis.    He has no past medical history of Amblyopia, Arthritis, Cataract, Diabetes mellitus (H), Diabetic retinopathy (H), Glaucoma, Glaucoma (increased eye pressure), Hypertension, Macular degeneration, Nonsenile cataract, Retinal detachment, Senile macular degeneration, Strabismus, Type 2 diabetes mellitus without complications (H), or Uveitis.  Patient Active Problem List   Diagnosis     CARDIOVASCULAR SCREENING; LDL GOAL LESS THAN 160     Ocular hypertension     Obesity, Class I, BMI 30-34.9       Past surgical History:  has a past surgical history that includes vasectomy (2002); REMOVAL GALLBLADDER (10/2004); tonsillectomy & adenoidectomy (2001); Uvulectomy (2001); and Colonoscopy with CO2 insufflation (N/A, 10/5/2016).    Medications:   Current Outpatient Medications:      acetaminophen (TYLENOL) 325 MG tablet, Take 650 mg by mouth, Disp: , Rfl:      ibuprofen (ADVIL/MOTRIN) 200 MG tablet, Take 200 mg by mouth every 4 hours as  needed for mild pain, Disp: , Rfl:      methocarbamol (ROBAXIN) 500 MG tablet, Take 1,000 mg by mouth (Patient not taking: Reported on 10/27/2021), Disp: , Rfl:     Allergies: No Known Allergies    REVIEW OF SYSTEMS:  CONSTITUTIONAL:NEGATIVE for fever, chills, change in weight  INTEGUMENTARY/SKIN: NEGATIVE for worrisome rashes, moles or lesions  MUSCULOSKELETAL:See HPI above  NEURO: NEGATIVE for weakness, dizziness or paresthesias      PHYSICAL EXAM:  BP (!) 138/92   Pulse 69   Ht 1.829 m (6')   Wt 106.1 kg (234 lb)   BMI 31.74 kg/m     GENERAL APPEARANCE: healthy, alert, no distress   SKIN: no suspicious lesions or rashes  NEURO: Normal strength and tone, mentation intact and speech normal  PSYCH:  mentation appears normal and affect normal  RESPIRATORY: No increased work of breathing.      left UPPER EXTREMITY:  Wound / Incision: healed well.  Inspection: no swelling, no ecchymosis , no deformity.  Palpation: nontender to palpation.   Strength: grossly intact.  Biceps tendon feels intact, taut  Range of motion: near full flexion, lacks ~15-20 degrees full extension.  Sensation intact to light touch in median, radial, ulnar and axillary nerve distributions  Palpable 2+ radial pulse, brisk capillary refill to all fingers, wwp  Intact epl fpl fdp edc wrist flexion/extension biceps triceps deltoid          X-RAY:  none indicated.          Impression: 51 year old male 6 weeks postoperative open repair left distal biceps, doing well.    Plan:     Weight bearing status: light weight bearing, ADLs only.,  Pain control: over the counter as needed.  Immobilization: hinged elbow brace, 20-full flexion, progress to full extension with Physical Therapy in a couple weeks as able.  continue therapy for active elbow extension, passive flexion in brace with set parameters as above  Elevation of affected extremity  Over the counter pain control as needed.  Images: none  Return to clinic in 4 weeks, or sooner as needed. Likely  discontinue brace at that time.      Ramón Delgado M.D., M.S.  Dept. of Orthopaedic Surgery  [facility]

## 2021-11-24 ENCOUNTER — OFFICE VISIT (OUTPATIENT)
Dept: ORTHOPEDICS | Facility: CLINIC | Age: 51
End: 2021-11-24
Payer: COMMERCIAL

## 2021-11-24 VITALS
HEIGHT: 72 IN | WEIGHT: 234 LBS | BODY MASS INDEX: 31.69 KG/M2 | HEART RATE: 69 BPM | DIASTOLIC BLOOD PRESSURE: 92 MMHG | SYSTOLIC BLOOD PRESSURE: 138 MMHG

## 2021-11-24 DIAGNOSIS — S46.212D RUPTURE OF DISTAL BICEPS TENDON, LEFT, SUBSEQUENT ENCOUNTER: Primary | ICD-10-CM

## 2021-11-24 PROCEDURE — 99024 POSTOP FOLLOW-UP VISIT: CPT | Performed by: ORTHOPAEDIC SURGERY

## 2021-11-24 ASSESSMENT — PAIN SCALES - GENERAL: PAINLEVEL: NO PAIN (0)

## 2021-11-24 ASSESSMENT — MIFFLIN-ST. JEOR: SCORE: 1954.42

## 2021-11-24 NOTE — LETTER
11/24/2021         RE: Ata Garcia  [address] [facility] [address]        Dear Colleague,    Thank you for referring your patient, Ata Garcia, to the [facility]. Please see a copy of my visit note below.    CHIEF COMPLAINT:   Chief Complaint   Patient presents with     Left Elbow - Surgical Followup     Distal biceps repair. DOS 10/12/21, 6 wk s/p. Patient notes his elbow is doing well today. He denies any pain today. He has received a hinged elbow brace.            SURGICAL PROCEDURE: left distal biceps tendon repair ([facility])  DATE OF PROCEDURE: 10/12/2021      HISTORY OF PRESENT ILLNESS    Ata Garcia is a 51 year old male seen for postoperative follow-up of a left distal biceps repair that occurred 6 weeks ago. Has been going to Physical Therapy, wearing hinged elbow brace working on expanding range of motion. No pain. Will have some soreness if he does too much, like opening car door with left arm.    Denies fevers, chills, night sweats. No wound problems. Compliant with weight bearing restrictions and elevation. There have been no issues since surgery.        Other PMH:  has a past medical history of Hypercholesterolemia, RAJIV (obstructive sleep apnea), and Prostatitis.    He has no past medical history of Amblyopia, Arthritis, Cataract, Diabetes mellitus (H), Diabetic retinopathy (H), Glaucoma, Glaucoma (increased eye pressure), Hypertension, Macular degeneration, Nonsenile cataract, Retinal detachment, Senile macular degeneration, Strabismus, Type 2 diabetes mellitus without complications (H), or Uveitis.  Patient Active Problem List   Diagnosis     CARDIOVASCULAR SCREENING; LDL GOAL LESS THAN 160     Ocular hypertension     Obesity, Class I, BMI 30-34.9       Past surgical History:  has a past surgical history that includes vasectomy (2002); REMOVAL GALLBLADDER (10/2004); tonsillectomy & adenoidectomy (2001); Uvulectomy (2001); and  Colonoscopy with CO2 insufflation (N/A, 10/5/2016).    Medications:   Current Outpatient Medications:      acetaminophen (TYLENOL) 325 MG tablet, Take 650 mg by mouth, Disp: , Rfl:      ibuprofen (ADVIL/MOTRIN) 200 MG tablet, Take 200 mg by mouth every 4 hours as needed for mild pain, Disp: , Rfl:      methocarbamol (ROBAXIN) 500 MG tablet, Take 1,000 mg by mouth (Patient not taking: Reported on 10/27/2021), Disp: , Rfl:     Allergies: No Known Allergies    REVIEW OF SYSTEMS:  CONSTITUTIONAL:NEGATIVE for fever, chills, change in weight  INTEGUMENTARY/SKIN: NEGATIVE for worrisome rashes, moles or lesions  MUSCULOSKELETAL:See HPI above  NEURO: NEGATIVE for weakness, dizziness or paresthesias      PHYSICAL EXAM:  BP (!) 138/92   Pulse 69   Ht 1.829 m (6')   Wt 106.1 kg (234 lb)   BMI 31.74 kg/m     GENERAL APPEARANCE: healthy, alert, no distress   SKIN: no suspicious lesions or rashes  NEURO: Normal strength and tone, mentation intact and speech normal  PSYCH:  mentation appears normal and affect normal  RESPIRATORY: No increased work of breathing.      left UPPER EXTREMITY:  Wound / Incision: healed well.  Inspection: no swelling, no ecchymosis , no deformity.  Palpation: nontender to palpation.   Strength: grossly intact.  Biceps tendon feels intact, taut  Range of motion: near full flexion, lacks ~15-20 degrees full extension.  Sensation intact to light touch in median, radial, ulnar and axillary nerve distributions  Palpable 2+ radial pulse, brisk capillary refill to all fingers, wwp  Intact epl fpl fdp edc wrist flexion/extension biceps triceps deltoid          X-RAY:  none indicated.          Impression: 51 year old male 6 weeks postoperative open repair left distal biceps, doing well.    Plan:     Weight bearing status: light weight bearing, ADLs only.,  Pain control: over the counter as needed.  Immobilization: hinged elbow brace, 20-full flexion, progress to full extension with Physical Therapy in a couple  weeks as able.  continue therapy for active elbow extension, passive flexion in brace with set parameters as above  Elevation of affected extremity  Over the counter pain control as needed.  Images: none  Return to clinic in 4 weeks, or sooner as needed. Likely discontinue brace at that time.      Ramón Delgado M.D., M.S.  Dept. of Orthopaedic Surgery  [facility]        Again, thank you for allowing me to participate in the care of your patient.        Sincerely,        Ramón Delgado MD

## 2021-12-01 ENCOUNTER — THERAPY VISIT (OUTPATIENT)
Dept: PHYSICAL THERAPY | Facility: CLINIC | Age: 51
End: 2021-12-01
Payer: COMMERCIAL

## 2021-12-01 DIAGNOSIS — S46.212D RUPTURE OF DISTAL BICEPS TENDON, LEFT, SUBSEQUENT ENCOUNTER: Primary | ICD-10-CM

## 2021-12-01 PROCEDURE — 97110 THERAPEUTIC EXERCISES: CPT | Mod: GP | Performed by: PHYSICAL THERAPIST

## 2021-12-14 ENCOUNTER — THERAPY VISIT (OUTPATIENT)
Dept: PHYSICAL THERAPY | Facility: CLINIC | Age: 51
End: 2021-12-14
Payer: COMMERCIAL

## 2021-12-14 DIAGNOSIS — S46.212D RUPTURE OF DISTAL BICEPS TENDON, LEFT, SUBSEQUENT ENCOUNTER: Primary | ICD-10-CM

## 2021-12-14 PROCEDURE — 97110 THERAPEUTIC EXERCISES: CPT | Mod: GP | Performed by: PHYSICAL THERAPIST

## 2021-12-27 ENCOUNTER — OFFICE VISIT (OUTPATIENT)
Dept: ORTHOPEDICS | Facility: CLINIC | Age: 51
End: 2021-12-27
Payer: COMMERCIAL

## 2021-12-27 VITALS
DIASTOLIC BLOOD PRESSURE: 88 MMHG | HEART RATE: 90 BPM | BODY MASS INDEX: 31.69 KG/M2 | WEIGHT: 234 LBS | HEIGHT: 72 IN | OXYGEN SATURATION: 97 % | SYSTOLIC BLOOD PRESSURE: 128 MMHG

## 2021-12-27 DIAGNOSIS — S46.212S BICEPS RUPTURE, DISTAL, LEFT, SEQUELA: Primary | ICD-10-CM

## 2021-12-27 PROCEDURE — 99024 POSTOP FOLLOW-UP VISIT: CPT | Performed by: ORTHOPAEDIC SURGERY

## 2021-12-27 ASSESSMENT — MIFFLIN-ST. JEOR: SCORE: 1954.42

## 2021-12-27 ASSESSMENT — PAIN SCALES - GENERAL: PAINLEVEL: NO PAIN (0)

## 2021-12-27 NOTE — LETTER
12/27/2021         RE: Ata Garcia  [address]  [facility] [address]        Dear Colleague,    Thank you for referring your patient, Ata Garcia, to the [facility]. Please see a copy of my visit note below.    CHIEF COMPLAINT:   Chief Complaint   Patient presents with     Left Elbow - Surgical Followup     Surgical Followup     left elbow post op DOS 10-12-21         SURGICAL PROCEDURE: left distal biceps tendon repair ([facility])  DATE OF PROCEDURE: 10/12/2021      HISTORY OF PRESENT ILLNESS    Ata Garcia is a 51 year old male seen for postoperative follow-up of a left distal biceps repair that occurred 11 weeks ago. Returns today doing well.  Has been going to Physical Therapy but not in the past couple of weeks. Just returned from [place] yesterday. Stopped wearing the brace for the most part a week ago, but will occasionally wear it if doing something more strenuous or at work. Has Physical Therapy appointment tomorrow. Some tightness with extension but not pain.        Other PMH:  has a past medical history of Hypercholesterolemia, RAJIV (obstructive sleep apnea), and Prostatitis.    He has no past medical history of Amblyopia, Arthritis, Cataract, Diabetes mellitus (H), Diabetic retinopathy (H), Glaucoma, Glaucoma (increased eye pressure), Hypertension, Macular degeneration, Nonsenile cataract, Retinal detachment, Senile macular degeneration, Strabismus, Type 2 diabetes mellitus without complications (H), or Uveitis.  Patient Active Problem List   Diagnosis     CARDIOVASCULAR SCREENING; LDL GOAL LESS THAN 160     Ocular hypertension     Obesity, Class I, BMI 30-34.9       Past surgical History:  has a past surgical history that includes vasectomy (2002); REMOVAL GALLBLADDER (10/2004); tonsillectomy & adenoidectomy (2001); Uvulectomy (2001); and Colonoscopy with CO2 insufflation (N/A, 10/5/2016).    Medications:   Current Outpatient  Medications:      acetaminophen (TYLENOL) 325 MG tablet, Take 650 mg by mouth, Disp: , Rfl:      ibuprofen (ADVIL/MOTRIN) 200 MG tablet, Take 200 mg by mouth every 4 hours as needed for mild pain, Disp: , Rfl:      methocarbamol (ROBAXIN) 500 MG tablet, Take 1,000 mg by mouth (Patient not taking: Reported on 10/27/2021), Disp: , Rfl:     Allergies: No Known Allergies    REVIEW OF SYSTEMS:  CONSTITUTIONAL:NEGATIVE for fever, chills, change in weight  INTEGUMENTARY/SKIN: NEGATIVE for worrisome rashes, moles or lesions  MUSCULOSKELETAL:See HPI above  NEURO: NEGATIVE for weakness, dizziness or paresthesias      PHYSICAL EXAM:  /88 (BP Location: Right arm, Patient Position: Sitting, Cuff Size: Adult Regular)   Pulse 90   Ht 1.829 m (6')   Wt 106.1 kg (234 lb)   SpO2 97%   BMI 31.74 kg/m     GENERAL APPEARANCE: healthy, alert, no distress   SKIN: no suspicious lesions or rashes  NEURO: Normal strength and tone, mentation intact and speech normal  PSYCH:  mentation appears normal and affect normal  RESPIRATORY: No increased work of breathing.      left UPPER EXTREMITY:  Wound / Incision: healed well.  Inspection: no swelling, no ecchymosis , no deformity.  Palpation: nontender to palpation.   Strength: grossly intact.  Biceps tendon feels intact, taut  Range of motion: within normal limits.  Sensation intact to light touch in median, radial, ulnar and axillary nerve distributions  Palpable 2+ radial pulse, brisk capillary refill to all fingers, wwp  Intact epl fpl fdp edc wrist flexion/extension biceps triceps deltoid          X-RAY:  none indicated.          Impression: 51 year old male 11 weeks postoperative open repair left distal biceps, doing well.    Plan:     Weight bearing status: light weight bearing. Progress with Physical Therapy strengthening.  Gradually resume normal activities over the next couple of months as strength returns.  Discontinue elbow brace at this time.  Over the counter pain control  as needed.  Images: none  Return to clinic as needed.      Ramón Delgado M.D., M.S.  Dept. of Orthopaedic Surgery  [facility]        Again, thank you for allowing me to participate in the care of your patient.        Sincerely,        Ramón Delgado MD

## 2021-12-27 NOTE — PROGRESS NOTES
CHIEF COMPLAINT:   Chief Complaint   Patient presents with     Left Elbow - Surgical Followup     Surgical Followup     left elbow post op DOS 10-12-21         SURGICAL PROCEDURE: left distal biceps tendon repair ([facility])  DATE OF PROCEDURE: 10/12/2021      HISTORY OF PRESENT ILLNESS    Ata Garcia is a 51 year old male seen for postoperative follow-up of a left distal biceps repair that occurred 11 weeks ago. Returns today doing well.  Has been going to Physical Therapy but not in the past couple of weeks. Just returned from [place] yesterday. Stopped wearing the brace for the most part a week ago, but will occasionally wear it if doing something more strenuous or at work. Has Physical Therapy appointment tomorrow. Some tightness with extension but not pain.        Other PMH:  has a past medical history of Hypercholesterolemia, RAJIV (obstructive sleep apnea), and Prostatitis.    He has no past medical history of Amblyopia, Arthritis, Cataract, Diabetes mellitus (H), Diabetic retinopathy (H), Glaucoma, Glaucoma (increased eye pressure), Hypertension, Macular degeneration, Nonsenile cataract, Retinal detachment, Senile macular degeneration, Strabismus, Type 2 diabetes mellitus without complications (H), or Uveitis.  Patient Active Problem List   Diagnosis     CARDIOVASCULAR SCREENING; LDL GOAL LESS THAN 160     Ocular hypertension     Obesity, Class I, BMI 30-34.9       Past surgical History:  has a past surgical history that includes vasectomy (2002); REMOVAL GALLBLADDER (10/2004); tonsillectomy & adenoidectomy (2001); Uvulectomy (2001); and Colonoscopy with CO2 insufflation (N/A, 10/5/2016).    Medications:   Current Outpatient Medications:      acetaminophen (TYLENOL) 325 MG tablet, Take 650 mg by mouth, Disp: , Rfl:      ibuprofen (ADVIL/MOTRIN) 200 MG tablet, Take 200 mg by mouth every 4 hours as needed for mild pain, Disp: , Rfl:      methocarbamol (ROBAXIN) 500 MG tablet, Take 1,000 mg by mouth  (Patient not taking: Reported on 10/27/2021), Disp: , Rfl:     Allergies: No Known Allergies    REVIEW OF SYSTEMS:  CONSTITUTIONAL:NEGATIVE for fever, chills, change in weight  INTEGUMENTARY/SKIN: NEGATIVE for worrisome rashes, moles or lesions  MUSCULOSKELETAL:See HPI above  NEURO: NEGATIVE for weakness, dizziness or paresthesias      PHYSICAL EXAM:  /88 (BP Location: Right arm, Patient Position: Sitting, Cuff Size: Adult Regular)   Pulse 90   Ht 1.829 m (6')   Wt 106.1 kg (234 lb)   SpO2 97%   BMI 31.74 kg/m     GENERAL APPEARANCE: healthy, alert, no distress   SKIN: no suspicious lesions or rashes  NEURO: Normal strength and tone, mentation intact and speech normal  PSYCH:  mentation appears normal and affect normal  RESPIRATORY: No increased work of breathing.      left UPPER EXTREMITY:  Wound / Incision: healed well.  Inspection: no swelling, no ecchymosis , no deformity.  Palpation: nontender to palpation.   Strength: grossly intact.  Biceps tendon feels intact, taut  Range of motion: within normal limits.  Sensation intact to light touch in median, radial, ulnar and axillary nerve distributions  Palpable 2+ radial pulse, brisk capillary refill to all fingers, wwp  Intact epl fpl fdp edc wrist flexion/extension biceps triceps deltoid          X-RAY:  none indicated.          Impression: 51 year old male 11 weeks postoperative open repair left distal biceps, doing well.    Plan:     Weight bearing status: light weight bearing. Progress with Physical Therapy strengthening.  Gradually resume normal activities over the next couple of months as strength returns.  Discontinue elbow brace at this time.  Over the counter pain control as needed.  Images: none  Return to clinic as needed.      Ramón Delgado M.D., M.S.  Dept. of Orthopaedic Surgery  Health system

## 2021-12-28 ENCOUNTER — THERAPY VISIT (OUTPATIENT)
Dept: PHYSICAL THERAPY | Facility: CLINIC | Age: 51
End: 2021-12-28
Payer: COMMERCIAL

## 2021-12-28 DIAGNOSIS — S46.212D RUPTURE OF DISTAL BICEPS TENDON, LEFT, SUBSEQUENT ENCOUNTER: Primary | ICD-10-CM

## 2021-12-28 PROCEDURE — 97112 NEUROMUSCULAR REEDUCATION: CPT | Mod: GP | Performed by: PHYSICAL THERAPIST

## 2021-12-28 PROCEDURE — 97110 THERAPEUTIC EXERCISES: CPT | Mod: GP | Performed by: PHYSICAL THERAPIST

## 2021-12-28 NOTE — PROGRESS NOTES
Subjective:  HPI  Physical Exam                    Objective:  System    Physical Exam    General     ROS    Assessment/Plan:    PROGRESS  REPORT    Progress reporting period is from 10/29/21 to 12/28/21.     SUBJECTIVE  Subjective: pt is 11 weeks pos op and reports feeling good, was cleared to remove brace with instruction for gradual return to normal UE weight bearing and strength over the next couple months, does not need to return to MD.   Current Pain level: 0/10   Initial Pain level: 8/10   Changes in function: Yes, see goal flow sheet for change in function   Adverse reactions: None;   ,     The objective findings are from DOS 12/28/21.    OBJECTIVE  Objective: full and painfree AROM L elbow, initiated strengthening LUE painfree.      ASSESSMENT/PLAN  Updated problem list and treatment plan: Diagnosis 1:  S/p L distal bicep repair  STG/LTGs have been met or progress has been made towards goals:  Yes, painfree full ROM, gradual progress to full strength  Assessment of Progress: The patient's condition is improving.  The patient's condition has potential to improve.  Self Management Plans:  Patient is independent in a home treatment program.  I have re-evaluated this patient and find that the nature, scope, duration and intensity of the therapy is appropriate for the medical condition of the patient.  Ata continues to require the following intervention to meet STG and LTG's: PT    Recommendations:  This patient would benefit from continued therapy.     Frequency:  1 X a month, once daily  Duration:  for 4 months.        Please refer to the daily flowsheet for treatment today, total treatment time and time spent performing 1:1 timed codes.

## 2022-01-25 ENCOUNTER — THERAPY VISIT (OUTPATIENT)
Dept: PHYSICAL THERAPY | Facility: CLINIC | Age: 52
End: 2022-01-25
Payer: COMMERCIAL

## 2022-01-25 DIAGNOSIS — S46.212D RUPTURE OF DISTAL BICEPS TENDON, LEFT, SUBSEQUENT ENCOUNTER: Primary | ICD-10-CM

## 2022-01-25 PROCEDURE — 97110 THERAPEUTIC EXERCISES: CPT | Mod: GP | Performed by: PHYSICAL THERAPIST

## 2022-01-25 PROCEDURE — 97140 MANUAL THERAPY 1/> REGIONS: CPT | Mod: GP | Performed by: PHYSICAL THERAPIST

## 2022-02-11 ENCOUNTER — OFFICE VISIT (OUTPATIENT)
Dept: FAMILY MEDICINE | Facility: CLINIC | Age: 52
End: 2022-02-11
Payer: COMMERCIAL

## 2022-02-11 VITALS
DIASTOLIC BLOOD PRESSURE: 84 MMHG | HEIGHT: 71 IN | RESPIRATION RATE: 14 BRPM | TEMPERATURE: 96.1 F | BODY MASS INDEX: 34.33 KG/M2 | SYSTOLIC BLOOD PRESSURE: 131 MMHG | WEIGHT: 245.2 LBS | OXYGEN SATURATION: 97 % | HEART RATE: 84 BPM

## 2022-02-11 DIAGNOSIS — R07.0 THROAT PAIN: Primary | ICD-10-CM

## 2022-02-11 DIAGNOSIS — Z20.822 SUSPECTED COVID-19 VIRUS INFECTION: ICD-10-CM

## 2022-02-11 LAB
DEPRECATED S PYO AG THROAT QL EIA: NEGATIVE
GROUP A STREP BY PCR: NOT DETECTED
SARS-COV-2 RNA RESP QL NAA+PROBE: NEGATIVE

## 2022-02-11 PROCEDURE — U0003 INFECTIOUS AGENT DETECTION BY NUCLEIC ACID (DNA OR RNA); SEVERE ACUTE RESPIRATORY SYNDROME CORONAVIRUS 2 (SARS-COV-2) (CORONAVIRUS DISEASE [COVID-19]), AMPLIFIED PROBE TECHNIQUE, MAKING USE OF HIGH THROUGHPUT TECHNOLOGIES AS DESCRIBED BY CMS-2020-01-R: HCPCS | Performed by: PHYSICIAN ASSISTANT

## 2022-02-11 PROCEDURE — U0005 INFEC AGEN DETEC AMPLI PROBE: HCPCS | Performed by: PHYSICIAN ASSISTANT

## 2022-02-11 PROCEDURE — 87651 STREP A DNA AMP PROBE: CPT | Performed by: PHYSICIAN ASSISTANT

## 2022-02-11 PROCEDURE — 99214 OFFICE O/P EST MOD 30 MIN: CPT | Performed by: PHYSICIAN ASSISTANT

## 2022-02-11 RX ORDER — CLOTRIMAZOLE 10 MG/1
10 LOZENGE ORAL
Qty: 70 LOZENGE | Refills: 0 | Status: SHIPPED | OUTPATIENT
Start: 2022-02-11 | End: 2022-02-25

## 2022-02-11 ASSESSMENT — PAIN SCALES - GENERAL: PAINLEVEL: MILD PAIN (3)

## 2022-02-11 ASSESSMENT — MIFFLIN-ST. JEOR: SCORE: 1995.35

## 2022-02-11 NOTE — PROGRESS NOTES
"  Assessment & Plan   Problem List Items Addressed This Visit     None      Visit Diagnoses     Throat pain    -  Primary    Relevant Medications    clotrimazole (MYCELEX) 10 MG lozenge    Other Relevant Orders    Symptomatic; Yes; 1/21/2022 COVID-19 Virus (Coronavirus) by PCR Nose    Streptococcus A Rapid Screen w/Reflex to PCR - Clinic Collect    Suspected COVID-19 virus infection        Relevant Orders    Symptomatic; Yes; 1/21/2022 COVID-19 Virus (Coronavirus) by PCR Nose         Impression is likely thrush given exam. Sore throat could signify a component of esophagitis as well. Strep and COVID-19 PCR negative. Appears well and non-toxic and I have low suspicion for impending airway obstruction or respiratory distress.  He will push p.o. fluids, use over-the-counter meds for symptoms, complete a course of clotrimazole troches and follow-up with us in 2 weeks if not improving or urgent care/the ER if symptoms worsen/change at any time.    Complete history and physical exam as below. AF with normal VS.    DDx and Dx discussed with and explained to the pt to their satisfaction.  All questions were answered at this time. Pt expressed understanding of and agreement with this dx, tx, and plan. No further workup warranted and standard medication warnings given. I have given the patient a list of pertinent indications for re-evaluation. Will go to the Emergency Department if symptoms worsen or new concerning symptoms arise. Patient left in no apparent distress.     38 minutes spent on the date of the encounter doing chart review, history and exam, documentation and further activities per the note    BMI:   Estimated body mass index is 33.84 kg/m  as calculated from the following:    Height as of this encounter: 1.813 m (5' 11.38\").    Weight as of this encounter: 111.2 kg (245 lb 3.2 oz).     See Patient Instructions    No follow-ups on file.    GLORIA Dunaway  [facility] NILESH    Subjective " "Ata is a 51 year old who presents for the following health issues     HPI     Acute Illness  Acute illness concerns: Runny nose, sore throat, sneezing  Onset/Duration: 3-4 weeks  Symptoms:  Fever: no  Chills/Sweats: no  Headache (location?): no  Sinus Pressure: no  Conjunctivitis:  no  Ear Pain: no  Rhinorrhea: YES  Congestion: no  Sore Throat: YES- 3-4 weeks  Cough: no  Wheeze: no  Decreased Appetite: no  Nausea: no  Vomiting: no  Diarrhea: no  Dysuria/Freq.: no  Dysuria or Hematuria: no  Fatigue/Achiness: no  Sick/Strep Exposure: no  Therapies tried and outcome: allergy pills    Review of Systems   Constitutional, HEENT, cardiovascular, pulmonary, gi and gu systems are negative, except as otherwise noted.      Objective    /84   Pulse 84   Temp (!) 96.1  F (35.6  C) (Tympanic)   Resp 14   Ht 1.813 m (5' 11.38\")   Wt 111.2 kg (245 lb 3.2 oz)   SpO2 97%   BMI 33.84 kg/m    Body mass index is 33.84 kg/m .  Physical Exam  Vitals and nursing note reviewed.   Constitutional:       General: He is not in acute distress.     Appearance: He is not ill-appearing or diaphoretic.   HENT:      Head: Normocephalic and atraumatic.      Right Ear: Tympanic membrane and ear canal normal.      Left Ear: Tympanic membrane and ear canal normal.      Nose: Nose normal.      Mouth/Throat:      Mouth: Mucous membranes are moist.      Comments: White-yellow exudate to tongue consistent with mild thrush.  Eyes:      Conjunctiva/sclera: Conjunctivae normal.   Neck:      Comments: No tenderness to anterior neck.  Cardiovascular:      Rate and Rhythm: Normal rate and regular rhythm.      Heart sounds: Normal heart sounds. No murmur heard.  No friction rub. No gallop.    Pulmonary:      Effort: Pulmonary effort is normal. No respiratory distress.      Breath sounds: Normal breath sounds. No stridor. No wheezing, rhonchi or rales.   Abdominal:      General: Bowel sounds are normal. There is no distension.      Palpations: " Abdomen is soft. There is no mass.      Tenderness: There is no abdominal tenderness. There is no guarding or rebound.      Hernia: No hernia is present.   Musculoskeletal:      Cervical back: Normal range of motion and neck supple. No rigidity.   Lymphadenopathy:      Cervical: No cervical adenopathy.   Skin:     General: Skin is warm and dry.   Neurological:      General: No focal deficit present.      Mental Status: He is alert. Mental status is at baseline.   Psychiatric:         Mood and Affect: Mood normal.         Behavior: Behavior normal.          Results for orders placed or performed in visit on 02/11/22   Symptomatic; Yes; 1/21/2022 COVID-19 Virus (Coronavirus) by PCR Nose     Status: Normal    Specimen: Nose; Swab   Result Value Ref Range    SARS CoV2 PCR Negative Negative, Testing sent to reference lab. Results will be returned via unsolicited result    Narrative    Testing was performed using the Xpert Xpress SARS-CoV-2 Assay on the  Cepheid Gene-Xpert Instrument Systems. Additional information about  this Emergency Use Authorization (EUA) assay can be found via the Lab  Guide. This test should be ordered for the detection of SARS-CoV-2 in  individuals who meet SARS-CoV-2 clinical and/or epidemiological  criteria. Test performance is unknown in asymptomatic patients. This  test is for in vitro diagnostic use under the FDA EUA for  laboratories certified under CLIA to perform high complexity testing.  This test has not been FDA cleared or approved. A negative result  does not rule out the presence of PCR inhibitors in the specimen or  target RNA in concentration below the limit of detection for the  assay. The possibility of a false negative should be considered if  the patient's recent exposure or clinical presentation suggests  COVID-19. This test was validated by the [facility]. This laboratory is certified under  the Clinical Laboratory Improvement  Amendments of 1988 (CLIA-88) as  qualified to perform high complexity laboratory testing.     Streptococcus A Rapid Screen w/Reflex to PCR - Clinic Collect     Status: Normal    Specimen: Throat; Swab   Result Value Ref Range    Group A Strep antigen Negative Negative   Group A Streptococcus PCR Throat Swab     Status: Normal    Specimen: Throat; Swab   Result Value Ref Range    Group A strep by PCR Not Detected Not Detected    Narrative    The Xpert Xpress Strep A test, performed on the [employer]  Instrument Systems, is a rapid, qualitative in vitro diagnostic test for the detection of Streptococcus pyogenes (Group A ß-hemolytic Streptococcus, Strep A) in throat swab specimens from patients with signs and symptoms of pharyngitis. The Xpert Xpress Strep A test can be used as an aid in the diagnosis of Group A Streptococcal pharyngitis. The assay is not intended to monitor treatment for Group A Streptococcus infections. The Xpert Xpress Strep A test utilizes an automated real-time polymerase chain reaction (PCR) to detect Streptococcus pyogenes DNA.

## 2022-02-11 NOTE — PATIENT INSTRUCTIONS
Jeff Berumen,    Thank you for allowing [facility] to manage your care.    I am unsure of the cause of your symptoms, but your exam is most consistent with thrush. We will see what our workup shows.     If you develop worsening/changing symptoms at any time, please call 911 or go to the emergency department for evaluation.    I ordered a CT of your neck. Please call diagnostic imaging [phone] to schedule your test if you are not improving in the next 2 weeks.    I sent your prescriptions to your pharmacy.    Please allow 1-2 business days for our office to contact you in regards to your laboratory/radiological studies.  If not done so, I encourage you to login into [employer] [email or web address] to review your results as well.     Drink 8-10 glasses of fluid daily to stay well-hydrated.    For your pain, please use Ibuprofen 400mg four times daily with food. Between ibuprofen doses, you may use Tylenol 650mg.     Max acetaminophen (Tylenol) 4,000mg/24 hours  Max ibuprofen 3,200mg/24 hours    If you have any questions or concerns, please feel free to call us at [phone]    Sincerely,    Liam Tam PA-C    Did you know?      You can schedule a video visit for follow-up appointments as well as future appointments for certain conditions.  Please see the below link.     [email or web address]    If you have not already done so,  I encourage you to sign up for [email or web address] [email or web address]).  This will allow you to review your results, securely communicate with a provider, and schedule virtual visits as well.      Patient Education     Candida Infection: Thrush  Thrush is a fungal infection in the mouth and throat. Thrush doesn't usually affect healthy adults. It's more common in people with a weak immune system. It's also more likely if you take antibiotics. Thrush is normally not contagious.   Understanding fungus in the mouth and  throat  Your mouth and throat normally contain millions of tiny organisms. These include bacteria and yeasts. Many of these don't cause any problems. In fact, they may help fight disease.   Yeasts are a type of fungus. A type of yeast called Candida normally lives on the membranes of your mouth and throat. It also lives in the digestive tract and on your skin. Usually, this yeast grows only in small amounts and is harmless. But in some cases, Candida can grow out of control and cause thrush. Thrush is related to other kinds of Candida infections that can occur at other parts of the body. Thrush refers to an infection of only the mouth and throat.   What causes thrush?  Thrush happens when something lets too much Candida grow inside your mouth and throat. Certain things that change the normal balance of organisms in the mouth can lead to thrush. One example is antibiotic medicine. This medicine may kill some of the normal bacteria in your mouth. Candida can then grow freely. People on antibiotics have an increased risk for thrush.   You have a higher risk for thrush if you:    Wear dentures    Are getting chemotherapy or radiation therapy    Have diabetes    Have a transplanted organ    Use corticosteroids, including inhaled corticosteroids for lung disease    Have a weak immune system, such as from HIV infection or AIDS    Are an older adult  Symptoms of thrush  Symptoms of thrush can include:    A dry, cottony feeling in your mouth    Cracking at the corners of the mouth    Loss of taste    Pain while eating or swallowing    White patches on the tongue and around the sides of the mouth  Diagnosing thrush  Your healthcare provider will ask about your medical history and your symptoms. He or she will look closely at your mouth and throat. White or red patches will be found and may be scraped with a tongue depressor. A sample may be looked at under a microscope or sent to a lab to test. Most cases are confirmed just  "by their appearance; testing can sometimes help to confirm thrush.   If you have thrush, you may also have esophageal candidiasis. This is more common in people who have AIDS or a weak immune system for another reason. Your healthcare provider may diagnose this based on your symptoms, and may check for this condition with an upper endoscopy. This is a procedure to look at the esophagus. A tissue sample may be taken to test.   Treatment for thrush  Thrush is usually treated with antifungal medicine. For mild cases, the medicine is often applied directly in your mouth and throat. This may be in the form of a  swish and swallow  medicine or an antifungal lozenge to suck on and dissolve in your mouth.   In more extensive cases, or if you have a weakened immune system, you may instead be treated with an antifungal pill. This can be a stronger treatment than a \"swish and swallow\" or lozenge antifungal. Or you may need medicine through an IV (intravenous) line. These treatments depend on how severe your infection is, and what other health conditions you have.   If you are at high risk for thrush, you may need to keep taking oral antifungal medicine. This is to help prevent thrush in the future.   What happens if you don t get treated for thrush?  If untreated, the Candida may make it difficult to eat or drink. Or it can spread to the esophagus and rarely to other parts your body.   Preventing thrush  You may be able to help prevent some cases of thrush. Make sure to:    Practice good oral hygiene.    Clean your dentures regularly as instructed. Make sure they fit you correctly.    After using a corticosteroid inhaler, rinse out your mouth with water or mouthwash.    Don't use broad-spectrum antibiotics, if possible.    Get treated for health problems that increase your risk for thrush, such as diabetes or HIV.  When to call the healthcare provider  Call your healthcare provider right away if you have any of " these:    Cottony feeling in your mouth    Loss of taste    Pain while eating or swallowing    White patches or plaques on your tongue or inside your mouth  [employer] last reviewed this educational content on 4/1/2020 2000-2021 [employer]. All rights reserved. This information is not intended as a substitute for professional medical care. Always follow your healthcare professional's instructions.           Patient Education     Self-Care for Sore Throats     Sore throats happen for many reasons, such as colds, allergies, cigarette smoke, air pollution, and infections caused by viruses or bacteria. In any case, your throat becomes red and sore. Your goal for self-care is to reduce your discomfort while giving your throat a chance to heal.  Moisten and soothe your throat  Tips include the following:    Try a sip of water first thing after waking up.    Keep your throat moist by drinking 6 or more glasses of clear liquids every day.    Run a cool-air humidifier in your room overnight.    Stay away from cigarette smoke.     Check the air quality index,if air pollution gives you a sore throat. On high pollution days, try to limit outdoor time.    Suck on throat lozenges, cough drops, hard candy, ice chips, or frozen fruit-juice bars. Use the sugar-free versions if your diet or medical condition requires them.  Gargle to ease irritation  Gargling every hour or 2 can ease irritation. Try gargling with 1 of these solutions:    1/4 teaspoon of salt in 1/2 cup of warm water    An over-the-counter anesthetic gargle  Use medicine for more relief  Over-the-counter medicine can reduce sore throat symptoms. Ask your pharmacist if you have questions about which medicine to use. To prevent possible medicine interactions, let the pharmacist know what medicines you take. To decrease symptoms:    Ease pain with anesthetic sprays. Aspirin or an aspirin substitute also helps. Remember, never give aspirin to anyone 18 or  younger. Don't take aspirin if you are already taking blood thinners.     For sore throats caused by allergies, try antihistamines to block the allergic reaction.  Unless a sore throat is caused by a bacterial infection, antibiotics won t help you.  Prevent future sore throats  Prevention tips include:    Stop smoking or reduce contact with secondhand smoke. Smoke irritates the tender throat lining.    Limit contact with pets and with allergy-causing substances, such as pollen and mold.    Wash your hands often when you re around someone with a sore throat or cold. This will keep viruses or bacteria from spreading.    Limit outdoor time when air pollution is bad.    Don t strain your vocal cords.  When to call your healthcare provider  Contact your healthcare provider if you have:    Fever of 100.4 F (38.0 C) or higher, or as directed by your healthcare provider    White spots on the throat    Great Trouble swallowing    A skin rash    Recent exposure to someone else with strep bacteria    Severe hoarseness and swollen glands in the neck or jaw  Call 911  Call 911 if any of the following occur:    Trouble breathing or catching your breath    Drooling and problems swallowing    Wheezing    Unable to talk    Feeling dizzy or faint    Feeling of doom  [employer] last reviewed this educational content on 9/1/2019 2000-2021 [employer]. All rights reserved. This information is not intended as a substitute for professional medical care. Always follow your healthcare professional's instructions.

## 2022-02-22 ENCOUNTER — THERAPY VISIT (OUTPATIENT)
Dept: PHYSICAL THERAPY | Facility: CLINIC | Age: 52
End: 2022-02-22
Payer: COMMERCIAL

## 2022-02-22 DIAGNOSIS — S46.212D RUPTURE OF DISTAL BICEPS TENDON, LEFT, SUBSEQUENT ENCOUNTER: Primary | ICD-10-CM

## 2022-02-22 PROCEDURE — 97112 NEUROMUSCULAR REEDUCATION: CPT | Mod: GP | Performed by: PHYSICAL THERAPIST

## 2022-02-22 PROCEDURE — 97530 THERAPEUTIC ACTIVITIES: CPT | Mod: GP | Performed by: PHYSICAL THERAPIST

## 2022-02-22 PROCEDURE — 97110 THERAPEUTIC EXERCISES: CPT | Mod: GP | Performed by: PHYSICAL THERAPIST

## 2022-02-22 NOTE — PROGRESS NOTES
Subjective:  HPI  Physical Exam                    Objective:  System    Physical Exam    General     ROS    Assessment/Plan:    DISCHARGE REPORT    Progress reporting period is from 12/28/21 to 2/22/22.     SUBJECTIVE  Subjective: pt is 19weeks post op and reports feeling ready to finish PT. he has occasional cramping in L bicep midbelly but not near repair site and not painful.   Current Pain level: 0/10   Initial Pain level: 8/10   Changes in function: Yes, see goal flow sheet for change in function   Adverse reactions: None;   ,     The objective findings are from DOS 2/22/22.    OBJECTIVE  Objective: AROM L elbow: 0-0-140degs, MMT normal 5/5 elb flx, ext, shoulder flx, abd, ext, add painfree. no swelling. able to lift/carry 35# crate repeatedly painfree.      ASSESSMENT/PLAN  Updated problem list and treatment plan: Diagnosis 1:  S/p L distal biceps tendon repair  STG/LTGs have been met or progress has been made towards goals:  Yes (See Goal flow sheet completed today.)  Assessment of Progress: The patient's condition is improving.  The patient's condition has potential to improve.  Self Management Plans:  Patient is independent in a home treatment program.  Ata continues to require the following intervention to meet STG and LTG's: PT intervention is no longer required to meet STG/LTG.  We will discharge this patient from PT.    Recommendations:  This patient is ready to be discharged from therapy and continue their home treatment program.    Please refer to the daily flowsheet for treatment today, total treatment time and time spent performing 1:1 timed codes.

## 2022-05-08 ENCOUNTER — HEALTH MAINTENANCE LETTER (OUTPATIENT)
Age: 52
End: 2022-05-08

## 2022-12-07 ENCOUNTER — OFFICE VISIT (OUTPATIENT)
Dept: FAMILY MEDICINE | Facility: CLINIC | Age: 52
End: 2022-12-07
Payer: COMMERCIAL

## 2022-12-07 VITALS
DIASTOLIC BLOOD PRESSURE: 85 MMHG | BODY MASS INDEX: 33.13 KG/M2 | HEIGHT: 72 IN | RESPIRATION RATE: 16 BRPM | SYSTOLIC BLOOD PRESSURE: 122 MMHG | HEART RATE: 78 BPM | OXYGEN SATURATION: 97 % | TEMPERATURE: 96.9 F | WEIGHT: 244.6 LBS

## 2022-12-07 DIAGNOSIS — Z12.5 SCREENING FOR PROSTATE CANCER: ICD-10-CM

## 2022-12-07 DIAGNOSIS — Z23 ENCOUNTER FOR IMMUNIZATION: ICD-10-CM

## 2022-12-07 DIAGNOSIS — Z11.59 NEED FOR HEPATITIS C SCREENING TEST: ICD-10-CM

## 2022-12-07 DIAGNOSIS — Z13.1 SCREENING FOR DIABETES MELLITUS: ICD-10-CM

## 2022-12-07 DIAGNOSIS — Z13.6 CARDIOVASCULAR SCREENING; LDL GOAL LESS THAN 160: ICD-10-CM

## 2022-12-07 DIAGNOSIS — Z00.00 ROUTINE HISTORY AND PHYSICAL EXAMINATION OF ADULT: Primary | ICD-10-CM

## 2022-12-07 DIAGNOSIS — N52.9 ERECTILE DYSFUNCTION, UNSPECIFIED ERECTILE DYSFUNCTION TYPE: ICD-10-CM

## 2022-12-07 LAB
ALBUMIN SERPL-MCNC: 3.8 G/DL (ref 3.4–5)
ALP SERPL-CCNC: 107 U/L (ref 40–150)
ALT SERPL W P-5'-P-CCNC: 46 U/L (ref 0–70)
ANION GAP SERPL CALCULATED.3IONS-SCNC: 4 MMOL/L (ref 3–14)
AST SERPL W P-5'-P-CCNC: 26 U/L (ref 0–45)
BILIRUB SERPL-MCNC: 0.7 MG/DL (ref 0.2–1.3)
BUN SERPL-MCNC: 15 MG/DL (ref 7–30)
CALCIUM SERPL-MCNC: 8.9 MG/DL (ref 8.5–10.1)
CHLORIDE BLD-SCNC: 107 MMOL/L (ref 94–109)
CHOLEST SERPL-MCNC: 183 MG/DL
CO2 SERPL-SCNC: 30 MMOL/L (ref 20–32)
CREAT SERPL-MCNC: 1.06 MG/DL (ref 0.66–1.25)
FASTING STATUS PATIENT QL REPORTED: YES
GFR SERPL CREATININE-BSD FRML MDRD: 84 ML/MIN/1.73M2
GLUCOSE BLD-MCNC: 138 MG/DL (ref 70–99)
HCV AB SERPL QL IA: NONREACTIVE
HDLC SERPL-MCNC: 35 MG/DL
LDLC SERPL CALC-MCNC: 115 MG/DL
NONHDLC SERPL-MCNC: 148 MG/DL
POTASSIUM BLD-SCNC: 4.2 MMOL/L (ref 3.4–5.3)
PROT SERPL-MCNC: 6.7 G/DL (ref 6.8–8.8)
PSA SERPL-MCNC: 2.25 UG/L (ref 0–4)
SODIUM SERPL-SCNC: 141 MMOL/L (ref 133–144)
TRIGL SERPL-MCNC: 166 MG/DL

## 2022-12-07 PROCEDURE — 91313 COVID-19 VACCINE BIVALENT BOOSTER 18+ (MODERNA): CPT | Performed by: FAMILY MEDICINE

## 2022-12-07 PROCEDURE — 90682 RIV4 VACC RECOMBINANT DNA IM: CPT | Performed by: FAMILY MEDICINE

## 2022-12-07 PROCEDURE — 99396 PREV VISIT EST AGE 40-64: CPT | Mod: 25 | Performed by: FAMILY MEDICINE

## 2022-12-07 PROCEDURE — 80053 COMPREHEN METABOLIC PANEL: CPT | Performed by: FAMILY MEDICINE

## 2022-12-07 PROCEDURE — G0103 PSA SCREENING: HCPCS | Performed by: FAMILY MEDICINE

## 2022-12-07 PROCEDURE — 86803 HEPATITIS C AB TEST: CPT | Performed by: FAMILY MEDICINE

## 2022-12-07 PROCEDURE — 36415 COLL VENOUS BLD VENIPUNCTURE: CPT | Performed by: FAMILY MEDICINE

## 2022-12-07 PROCEDURE — 80061 LIPID PANEL: CPT | Performed by: FAMILY MEDICINE

## 2022-12-07 PROCEDURE — 0134A COVID-19 VACCINE BIVALENT BOOSTER 18+ (MODERNA): CPT | Performed by: FAMILY MEDICINE

## 2022-12-07 PROCEDURE — 90471 IMMUNIZATION ADMIN: CPT | Performed by: FAMILY MEDICINE

## 2022-12-07 RX ORDER — SILDENAFIL 100 MG/1
TABLET, FILM COATED ORAL
Qty: 20 TABLET | Refills: 3 | Status: SHIPPED | OUTPATIENT
Start: 2022-12-07

## 2022-12-07 ASSESSMENT — ENCOUNTER SYMPTOMS
ABDOMINAL PAIN: 1
DYSURIA: 0
EYE PAIN: 0
CONSTIPATION: 0
WEAKNESS: 0
NAUSEA: 0
CHILLS: 0
FREQUENCY: 0
PALPITATIONS: 0
ARTHRALGIAS: 0
FEVER: 0
HEARTBURN: 0
NERVOUS/ANXIOUS: 0
DIZZINESS: 0
SHORTNESS OF BREATH: 0
MYALGIAS: 0
COUGH: 0
DIARRHEA: 0
PARESTHESIAS: 0
HEMATURIA: 0
HEADACHES: 0
HEMATOCHEZIA: 0
JOINT SWELLING: 0
SORE THROAT: 0

## 2022-12-07 ASSESSMENT — PAIN SCALES - GENERAL: PAINLEVEL: MILD PAIN (2)

## 2022-12-07 NOTE — PROGRESS NOTES
SUBJECTIVE:   CC: Ata is an 52 year old who presents for preventative health visit.   Patient has been advised of split billing requirements and indicates understanding: Yes  Healthy Habits:     Getting at least 3 servings of Calcium per day:  Yes    Bi-annual eye exam:  Yes    Dental care twice a year:  Yes    Sleep apnea or symptoms of sleep apnea:  Sleep apnea    Diet:  Regular (no restrictions)    Frequency of exercise:  None    Taking medications regularly:  Yes    Barriers to taking medications:  Not applicable    Medication side effects:  None    PHQ-2 Total Score: 0    Additional concerns today:  Yes      Today's PHQ-2 Score:   PHQ-2 ( 1999 [employer]) 12/7/2022   Q1: Little interest or pleasure in doing things 0   Q2: Feeling down, depressed or hopeless 0   PHQ-2 Score 0   PHQ-2 Total Score (12-17 Years)- Positive if 3 or more points; Administer PHQ-A if positive -   Q1: Little interest or pleasure in doing things Not at all   Q2: Feeling down, depressed or hopeless Not at all   PHQ-2 Score 0       Have you ever done Advance Care Planning? (For example, a Health Directive, POLST, or a discussion with a medical provider or your loved ones about your wishes): No, advance care planning information given to patient to review.  Patient declined advance care planning discussion at this time.    Social History     Tobacco Use     Smoking status: Never     Smokeless tobacco: Never     Tobacco comments:     Never a smoker   Substance Use Topics     Alcohol use: Yes     Comment: occasionally       Alcohol Use 12/7/2022   Prescreen: >3 drinks/day or >7 drinks/week? No   Prescreen: >3 drinks/day or >7 drinks/week? -     Last PSA: No results found for: PSA    Reviewed orders with patient. Reviewed health maintenance and updated orders accordingly - Yes  Lab work is in process  Labs reviewed in EPIC  BP Readings from Last 3 Encounters:   12/07/22 122/85   02/11/22 131/84   12/27/21 128/88    Wt Readings from Last 3  Encounters:   12/07/22 110.9 kg (244 lb 9.6 oz)   02/11/22 111.2 kg (245 lb 3.2 oz)   12/27/21 106.1 kg (234 lb)                  Patient Active Problem List   Diagnosis     CARDIOVASCULAR SCREENING; LDL GOAL LESS THAN 160     Ocular hypertension     Obesity, Class I, BMI 30-34.9     Past Surgical History:   Procedure Laterality Date     COLONOSCOPY  10/5/2016     COLONOSCOPY WITH CO2 INSUFFLATION N/A 10/05/2016    Procedure: COLONOSCOPY WITH CO2 INSUFFLATION;  Surgeon: Glenn Joiner DO;  Location: [facility]     HC REMOVAL GALLBLADDER  10/01/2004     TONSILLECTOMY & ADENOIDECTOMY  01/01/2001    for RAJIV     UVULECTOMY  01/01/2001    for RAJIV     VASECTOMY  01/01/2002       Social History     Tobacco Use     Smoking status: Never     Smokeless tobacco: Never     Tobacco comments:     Never a smoker   Substance Use Topics     Alcohol use: Yes     Comment: occasionally     Family History   Problem Relation Age of Onset     Lymphoma Father         non-H     Diabetes Father         type 2     Breast Cancer Sister      Cancer Sister      Diabetes Sister      Diabetes Maternal Grandfather         type 1     Diabetes Mother      Glaucoma No family hx of      Macular Degeneration No family hx of      Hypertension No family hx of      Cerebrovascular Disease No family hx of      Thyroid Disease No family hx of      Coronary Artery Disease No family hx of          Current Outpatient Medications   Medication Sig Dispense Refill     acetaminophen (TYLENOL) 325 MG tablet Take 650 mg by mouth       ibuprofen (ADVIL/MOTRIN) 200 MG tablet Take 200 mg by mouth every 4 hours as needed for mild pain       sildenafil (VIAGRA) 100 MG tablet TAKE ONE-HALF TO ONE TABLET BY MOUTH 30 MINUTES TO 4 HOURS BEFORE SEXUAL ACTIVITY 20 tablet 3     No Known Allergies    Reviewed and updated as needed this visit by clinical staff   Tobacco  Allergies  Meds              Reviewed and updated as needed this visit by Provider                     Review  "of Systems   Constitutional: Negative for chills and fever.   HENT: Negative for congestion, ear pain, hearing loss and sore throat.    Eyes: Negative for pain and visual disturbance.   Respiratory: Negative for cough and shortness of breath.    Cardiovascular: Negative for chest pain, palpitations and peripheral edema.   Gastrointestinal: Positive for abdominal pain. Negative for constipation, diarrhea, heartburn, hematochezia and nausea.   Genitourinary: Positive for impotence. Negative for dysuria, frequency, genital sores, hematuria, penile discharge and urgency.   Musculoskeletal: Negative for arthralgias, joint swelling and myalgias.   Skin: Negative for rash.   Neurological: Negative for dizziness, weakness, headaches and paresthesias.   Psychiatric/Behavioral: Negative for mood changes. The patient is not nervous/anxious.      CONSTITUTIONAL: NEGATIVE for fever, chills, change in weight  INTEGUMENTARY/SKIN: NEGATIVE for worrisome rashes, moles or lesions  EYES: NEGATIVE for vision changes or irritation  ENT: NEGATIVE for ear, mouth and throat problems  RESP: NEGATIVE for significant cough or SOB  CV: NEGATIVE for chest pain, palpitations or peripheral edema  GI: NEGATIVE for nausea, abdominal pain, heartburn, or change in bowel habits   male: negative for dysuria, hematuria, decreased urinary stream, erectile dysfunction, urethral discharge  MUSCULOSKELETAL: NEGATIVE for significant arthralgias or myalgia  NEURO: NEGATIVE for weakness, dizziness or paresthesias  PSYCHIATRIC: NEGATIVE for changes in mood or affect    OBJECTIVE:   /85 (BP Location: Left arm, Patient Position: Chair, Cuff Size: Adult Regular)   Pulse 78   Temp 96.9  F (36.1  C) (Tympanic)   Resp 16   Ht 1.816 m (5' 11.5\")   Wt 110.9 kg (244 lb 9.6 oz)   SpO2 97%   BMI 33.64 kg/m      Physical Exam  GENERAL: healthy, alert and no distress  NECK: no adenopathy, no asymmetry, masses, or scars and thyroid normal to palpation  RESP: " "lungs clear to auscultation - no rales, rhonchi or wheezes  CV: regular rate and rhythm, normal S1 S2, no S3 or S4, no murmur, click or rub, no peripheral edema and peripheral pulses strong  ABDOMEN: soft, nontender, no hepatosplenomegaly, no masses and bowel sounds normal  MS: no gross musculoskeletal defects noted, no edema    Diagnostic Test Results:  Labs reviewed in Epic    ASSESSMENT/PLAN:   (Z00.00) Routine history and physical examination of adult  (primary encounter diagnosis)  Comment:   Plan: as below    (Z13.6) CARDIOVASCULAR SCREENING; LDL GOAL LESS THAN 160  Comment:   Plan: Lipid panel reflex to direct LDL Non-fasting,         Comprehensive metabolic panel (BMP + Alb, Alk         Phos, ALT, AST, Total. Bili, TP)            (Z13.1) Screening for diabetes mellitus  Comment:   Plan: Comprehensive metabolic panel (BMP + Alb, Alk         Phos, ALT, AST, Total. Bili, TP)            (Z12.5) Screening for prostate cancer  Comment:   Plan: PSA, screen            (Z11.59) Need for hepatitis C screening test  Comment:   Plan: Hepatitis C Screen Reflex to HCV RNA Quant and         Genotype            (N52.9) Erectile dysfunction, unspecified erectile dysfunction type  Comment:   Plan: sildenafil (VIAGRA) 100 MG tablet            (Z23) Encounter for immunization  Comment:   Plan: INFLUENZA VACCINE 50-64 OR 18-64 W/EGG ALLERGY         (FLUBLOK), COVID-19 VACCINE BIVALENT BOOSTER         18+ (MODERNA)              Patient has been advised of split billing requirements and indicates understanding: Yes      COUNSELING:   Reviewed preventive health counseling, as reflected in patient instructions       Regular exercise       Healthy diet/nutrition       Vision screening      BMI:   Estimated body mass index is 33.64 kg/m  as calculated from the following:    Height as of this encounter: 1.816 m (5' 11.5\").    Weight as of this encounter: 110.9 kg (244 lb 9.6 oz).         He reports that he has never smoked. He has " never used smokeless tobacco.        Wally Rubin MD, MD  [facility]

## 2022-12-07 NOTE — PATIENT INSTRUCTIONS
At [facility], we strive to deliver an exceptional experience to you, every time we see you. If you receive a survey, please complete it as we do value your feedback.  If you have MyChart, you can expect to receive results automatically within 24 hours of their completion.  Your provider will send a note interpreting your results as well.   If you do not have MyChart, you should receive your results in about a week by mail.    Your care team:                            Family Medicine Internal Medicine   MD Kwesi Farah MD Shantel Branch-Fleming, MD Srinivasa Vaka, MD Katya Belousova, PAWILLY Luciano CNP, MD ([facility]   Leonel Rodas, MD Mikayla Spicer MD Amelia Massimini APRN DARYL Jauregui APRN MD Edmund Little MD          Clinic hours: Monday - Thursday 7 am-6 pm; Fridays 7 am-5 pm.   Urgent care: Monday - Friday 10 am- 8 pm; Saturday and Sunday 9 am-5 pm.    Clinic: [phone]       [facility]: Monday - Thursday 8 am - 7 pm; Friday 8 am - 6 pm  [facility]: [phone]

## 2022-12-15 ENCOUNTER — LAB (OUTPATIENT)
Dept: LAB | Facility: CLINIC | Age: 52
End: 2022-12-15
Payer: COMMERCIAL

## 2022-12-15 DIAGNOSIS — Z13.1 SCREENING FOR DIABETES MELLITUS: ICD-10-CM

## 2022-12-15 LAB — HBA1C MFR BLD: 6.6 % (ref 0–5.6)

## 2022-12-15 PROCEDURE — 83036 HEMOGLOBIN GLYCOSYLATED A1C: CPT

## 2022-12-15 PROCEDURE — 36415 COLL VENOUS BLD VENIPUNCTURE: CPT

## 2023-01-27 NOTE — MR AVS SNAPSHOT
After Visit Summary   2/12/2018    Ata Garcia    MRN: [id number]           Patient Information     Date Of Birth          1970        Visit Information        Provider Department      2/12/2018 2:20 PM Jane Dwyer NP [facility]        Today's Diagnoses     Chronic pain of left knee    -  1    Chronic pain of both knees          Care Instructions    Your xray looks normal to me, I will notify you if the radiologist sees any abnormalities.  I will let you know your lab results when I get them back, and we can go from there based on treatment.  Follow up if your symptoms worsen.   Reducing Knee Pain and Swelling    Many treatments can help reduce pain and swelling in your knee. Your healthcare provider or physical therapist may suggest one or more of the following treatments:    Icing your knee helps reduce swelling. You may be asked to ice your knee once a day or more. Apply ice for about 15 to 20 minutes at a time, with at least 40 minutes between sessions. Always keep a towel between the ice and your skin.     Keeping your leg raised above your heart helps excess fluid flow out of your knee joint. This reduces swelling.    Compression means wrapping an elastic bandage or neoprene sleeve snugly around your knees. This keeps fluid from collecting in your knee joint.    Electrical stimulation, done by a physical therapist or , can help reduce excess fluid in your knee joint.    Anti-inflammatory medicines may be prescribed by your healthcare provider. You may take pills or receive injections in your knee.    Isometric (mariaa) exercises strengthen the muscles that support your knee joint. They also help reduce excess fluid in your knee.    Massage helps fluid drain away from your knee.  Date Last Reviewed: 10/13/2015    4005-3784 [employer]. [address] [place] [address]. All rights reserved. This information is not intended as a  substitute for professional medical care. Always follow your healthcare professional's instructions.            Understanding Osteoarthritis of the Knee    A joint is a place where two bones meet. The knee is called a hinge joint. This joint is formed where the thighbone (femur) meets the shinbone (tibia). A healthy knee joint bends freely. Knee osteoarthritis is a condition where parts of the knee joint wear out. This can lead to pain, stiffness, and limited movement.   What is osteoarthritis?  Every joint contains a smooth tissue called cartilage. Cartilage cushions the ends of bones and helps bones in a joint glide smoothly against each other. Knee osteoarthritis occurs when cartilage in the knee joint begins to break down and wear away. Bones may become exposed and rub together. The cartilage may become irritated and rough. This prevents smooth movement of the joint and can lead to pain.  Causes of osteoarthritis of the knee  Causes can include:    Wear and tear from normal use over time    Overuse of the knee during sports or work activities    Being overweight. This increases stress on the knee joint.    Misalignment of the knee joint    Injury to the knee  Symptoms of osteoarthritis of the knee  Common symptoms include:    Pain and swelling around the joint. The pain and swelling get worse with activity and better with rest.    Grinding sound when moving the knee    Reduced knee movement    Knee stiffness. This is often worse first thing in the morning.  Treating osteoarthritis of the knee  Osteoarthritis is a long-term condition. Treatment usually focuses on managing symptoms. Treatment may include:    Over-the-counter or prescription medicines taken by mouth to help relieve pain and swelling    Injections of medicine into the joint to help relieve symptoms for a time    A weight-loss plan for people who are overweight    A plan of physical therapy and exercises to improve the strength and flexibility of the  muscles around the knee    Heat or cold therapy to help relieve pain and stiffness    Assistive devices that help with movement, such as a cane or a walker    Assistive devices that make activities of daily life easier, such as raised toilet seats or shower bars  If other treatments don t do enough to relieve symptoms, you may need surgery to replace the joint. During this surgery, the damaged joint is removed. An artificial knee joint is then put into place. This can help relieve pain and stiffness and restore movement of the knee.     When to call your healthcare provider  Call your healthcare provider right away if you have any of these:    Fever of 100.4 F (38 C) or higher, or as directed    Symptoms that don t get better with prescribed medicines or get worse    New symptoms   Date Last Reviewed: 3/10/2016    [id number] [employer]. [address] [place] [address]. All rights reserved. This information is not intended as a substitute for professional medical care. Always follow your healthcare professional's instructions.                Follow-ups after your visit        Follow-up notes from your care team     Return if symptoms worsen or fail to improve.      Who to contact     Normal or non-critical lab and imaging results will be communicated to you by SkillSlatehart, letter or phone within 4 business days after the clinic has received the results. If you do not hear from us within 7 days, please contact the clinic through Picfairt or phone. If you have a critical or abnormal lab result, we will notify you by phone as soon as possible.  Submit refill requests through Alkermes or call your pharmacy and they will forward the refill request to us. Please allow 3 business days for your refill to be completed.          If you need to speak with a  for additional information , please call: [phone]             Additional Information About Your Visit        Alk[employer]es Information      [facility]asper gives you secure access to your electronic health record. If you see a primary care provider, you can also send messages to your care team and make appointments. If you have questions, please call your primary care clinic.  If you do not have a primary care provider, please call [phone] and they will assist you.        Care EveryWhere ID     This is your Care EveryWhere ID. This could be used by other organizations to access your [place] medical records  [id number]        Your Vitals Were     Pulse Temperature Height Pulse Oximetry BMI (Body Mass Index)       76 97.5  F (36.4  C) (Oral) 6' (1.829 m) 99% 32.47 kg/m2        Blood Pressure from Last 3 Encounters:   02/12/18 138/87   10/05/16 118/77   09/13/16 119/82    Weight from Last 3 Encounters:   02/12/18 239 lb 6.4 oz (108.6 kg)   09/30/16 218 lb (98.9 kg)   09/13/16 221 lb (100.2 kg)              We Performed the Following     Anti Nuclear Scarlet IgG by IFA with Reflex     Erythrocyte sedimentation rate auto     Lyme Disease Scarlet with reflex to WB Serum     Rheumatoid factor     Uric acid          Today's Medication Changes          These changes are accurate as of 2/12/18  2:43 PM.  If you have any questions, ask your nurse or doctor.               Start taking these medicines.        Dose/Directions    diclofenac 50 MG EC tablet   Commonly known as:  VOLTAREN   Used for:  Chronic pain of both knees   Started by:  Jane Dwyer NP        Dose:  50 mg   Take 1 tablet (50 mg) by mouth 3 times daily as needed for moderate pain   Quantity:  30 tablet   Refills:  1       glucosamine-chondroitin 500-400 MG Caps per capsule   Commonly known as:  GLUCOSAMINE CHONDR COMPLEX   Used for:  Chronic pain of both knees   Started by:  Jane Dwyer NP        Dose:  1 capsule   Take 1 capsule by mouth daily   Quantity:  90 capsule   Refills:  1         Stop taking these medicines if you haven't already. Please contact your care team if you have questions.      IBUPROFEN PO   Stopped by:  Jane Dwyer NP                Where to get your medicines      These medications were sent to [facility] [place] - [address] [address]  [address] [place] [address]     Phone:  [phone]     diclofenac 50 MG EC tablet    glucosamine-chondroitin 500-400 MG Caps per capsule                Primary Care Provider Office Phone # Fax #    Levy Hopkins [phone] [address] [place] [place] [address]        Equal Access to Services     [facility]: Hadii aad ku hadasho Soomaali, waaxda luqadaha, qaybta kaalmada adeegyada, waxay idiin hayaan adeeg kharashayna donnelly . So [facility] [phone].    ATENCIÓN: Si habla español, tiene a lucas disposición servicios gratuitos de asistencia lingüística. [facility] [phone].    We comply with applicable federal civil rights laws and [place] laws. We do not discriminate on the basis of race, color, national origin, age, disability, sex, sexual orientation, or gender identity.            Thank you!     Thank you for choosing [facility]  for your care. Our goal is always to provide you with excellent care. Hearing back from our patients is one way we can continue to improve our services. Please take a few minutes to complete the written survey that you may receive in the mail after your visit with us. Thank you!             Your Updated Medication List - Protect others around you: Learn how to safely use, store and throw away your medicines at www.disposemymeds.org.          This list is accurate as of 2/12/18  2:43 PM.  Always use your most recent med list.                   Brand Name Dispense Instructions for use Diagnosis    diclofenac 50 MG EC tablet    VOLTAREN    30 tablet    Take 1 tablet (50 mg) by mouth 3 times daily as needed for moderate pain    Chronic pain of both knees       glucosamine-chondroitin 500-400 MG Caps per capsule    GLUCOSAMINE CHONDR COMPLEX    90  capsule    Take 1 capsule by mouth daily    Chronic pain of both knees          Atrial fibrillation, new onset no

## 2023-02-28 NOTE — PROGRESS NOTES
Pre-procedure checklist reviewed, AUC complete and pre-sedation note complete.    MD aware of maximum contrast dose of 24 mL. Pt is completing a home sleep test. Pt was instructed on how to put on the Noxturnal T3 device and associated equipment before going to bed and given the opportunity to practice putting it on before leaving the sleep center. Pt was reminded to bring the home sleep test kit back to the center tomorrow, at agreed upon time for download and reporting.

## 2024-02-11 ENCOUNTER — HEALTH MAINTENANCE LETTER (OUTPATIENT)
Age: 54
End: 2024-02-11

## 2024-05-01 ENCOUNTER — TELEPHONE (OUTPATIENT)
Dept: SCHEDULING | Facility: CLINIC | Age: 54
End: 2024-05-01
Payer: COMMERCIAL

## 2024-05-01 DIAGNOSIS — G47.33 OSA (OBSTRUCTIVE SLEEP APNEA): Primary | ICD-10-CM

## 2024-05-01 NOTE — TELEPHONE ENCOUNTER
Last ov 10/2/20  Next ov 10/1/24    Patient requesting updated order for CPAP supplies prior to appointment. Order pended and routed to provider for consideration.    Uses [facility]    Jennifer MORALES RN  [facility]

## 2024-05-01 NOTE — TELEPHONE ENCOUNTER
General Call    Contacts         Type Contact Phone/Fax    2024 01:01 PM CDT Phone (Incoming) Ata Garcia (Self) [phone] (M)          Reason for Call:  CPAP Supplies Prescription    What are your questions or concerns:  PT's cat chewed a hole into his hose, and needs a new CPAP prescription for supplies. PT was told his  and needs a new one. Pt scheduled first available at  location since that is closest to him, but soonest is 10/01/2024. Please reach out to patient if this is possible.     Date of last appointment with provider: Henrik: 2020    Could we send this information to you in [employer] or would you prefer to receive a phone call?:   No preference   Okay to leave a detailed message?: Yes at Cell number on file:    Telephone Information:   Mobile [phone]

## 2024-08-20 ENCOUNTER — TELEPHONE (OUTPATIENT)
Dept: SLEEP MEDICINE | Facility: CLINIC | Age: 54
End: 2024-08-20
Payer: COMMERCIAL

## 2024-08-20 NOTE — TELEPHONE ENCOUNTER
Pt was called to reschedule appt on 10/1 with Dr Dai. M for them to call back.       Laureen Alexis    Ananya Coleman

## 2024-12-10 ENCOUNTER — VIRTUAL VISIT (OUTPATIENT)
Dept: SLEEP MEDICINE | Facility: CLINIC | Age: 54
End: 2024-12-10
Payer: COMMERCIAL

## 2024-12-10 VITALS — WEIGHT: 245 LBS | HEIGHT: 72 IN | BODY MASS INDEX: 33.18 KG/M2

## 2024-12-10 DIAGNOSIS — G47.33 OSA (OBSTRUCTIVE SLEEP APNEA): Primary | ICD-10-CM

## 2024-12-10 PROCEDURE — 99203 OFFICE O/P NEW LOW 30 MIN: CPT | Mod: 95

## 2024-12-10 ASSESSMENT — SLEEP AND FATIGUE QUESTIONNAIRES
HOW LIKELY ARE YOU TO NOD OFF OR FALL ASLEEP WHILE SITTING AND READING: SLIGHT CHANCE OF DOZING
HOW LIKELY ARE YOU TO NOD OFF OR FALL ASLEEP WHEN YOU ARE A PASSENGER IN A CAR FOR AN HOUR WITHOUT A BREAK: SLIGHT CHANCE OF DOZING
HOW LIKELY ARE YOU TO NOD OFF OR FALL ASLEEP WHILE WATCHING TV: SLIGHT CHANCE OF DOZING
HOW LIKELY ARE YOU TO NOD OFF OR FALL ASLEEP IN A CAR, WHILE STOPPED FOR A FEW MINUTES IN TRAFFIC: WOULD NEVER DOZE
HOW LIKELY ARE YOU TO NOD OFF OR FALL ASLEEP WHILE SITTING QUIETLY AFTER LUNCH WITHOUT ALCOHOL: WOULD NEVER DOZE
HOW LIKELY ARE YOU TO NOD OFF OR FALL ASLEEP WHILE LYING DOWN TO REST IN THE AFTERNOON WHEN CIRCUMSTANCES PERMIT: WOULD NEVER DOZE
HOW LIKELY ARE YOU TO NOD OFF OR FALL ASLEEP WHILE SITTING INACTIVE IN A PUBLIC PLACE: WOULD NEVER DOZE
HOW LIKELY ARE YOU TO NOD OFF OR FALL ASLEEP WHILE SITTING AND TALKING TO SOMEONE: WOULD NEVER DOZE

## 2024-12-10 ASSESSMENT — PAIN SCALES - GENERAL: PAINLEVEL_OUTOF10: MILD PAIN (2)

## 2024-12-10 NOTE — PROGRESS NOTES
Virtual Visit Details  Type of service: Video Visit   Start time: 1:59 PM  End time: 2:27 PM  Originating Location (pt. Location): Home  Distant Location (provider location): Off-site  Platform used for Video Visit: [facility]    Outpatient Sleep Medicine Consultation:      Name: Ata Garcia MRN# [id number]   Age: 54 year old YOB: 1970     Date of Consultation: December 10, 2024  Consultation is requested by: Referred Self, MD  No address on file Referred Self  Primary care provider: No Ref-Primary, Physician       Reason for Sleep Consult:     Ata Garcia is sent by Referred Self for a sleep consultation regarding previously diagnosed RAJIV.    Patient s Reason for visit  Ata Garcia main reason for visit:    Patient states problem(s) started:    Ata Garcia's goals for this visit:             Assessment and Plan:     Summary Sleep Diagnoses:  (G47.33) RAJIV (obstructive sleep apnea) (primary encounter diagnosis)  Comment: Ata is a 54-year-old male who presents to the sleep clinic to reestablish care for previously diagnosed severe RAJIV. LOV was on 10/02/2020. He was set up with CPAP on 07/31/2020. He has been using his CPAP machine regularly since his diagnosis. He is in need of a new prescription for supplies. Ata feels more rested with CPAP use. He is tolerating his CPAP pressures. His download shows his apnea is well controlled with a residual AHI of 0.4 events per hour. His leak is acceptable.     Plan: Comprehensive DME  Continue auto-PAP 5-15 cmH2O. A prescription was written for new supplies. We reviewed recommendations for cleaning and replacing supplies.      Comorbid Diagnoses:  Obesity    Summary Recommendations:  Orders Placed This Encounter   Procedures    Comprehensive DME     Summary Counseling:    Sleep Testing Reviewed  Obstructive Sleep Apnea Reviewed  Complications of Untreated Sleep Apnea Reviewed    Patient will follow up in 2 years.   Malou Meadows, APRN CNP    Total  time spent reviewing medical records, history and physical examination, review of previous testing and interpretation as well as documentation on this date: 35 minutes     CC: Referred Self          History of Present Illness:     Ata presents to the sleep clinic to reestablish care for previously diagnosed severe RAJIV. LOV was on 10/02/2020. He was set up with CPAP on 07/31/2020. He has been using his CPAP machine regularly since his diagnosis. He is in need of a new prescription for supplies.      He feels more rested with CPAP. He is tolerating his CPAP pressures.       He feels rested in the morning.    History of tonsillectomy, adenoidectomy, and uvulectomy in his 30s.     Past Sleep Evaluations: HST on 07/09/2020 at 240 lbs.- AHI 48 events per hour, 82.9 per hour supine. Time spent less than or equal to 88% was 29.5 minutes.    DME: [facility]    Snoring: No  Mask Leak: No  Type of Mask: nasal cradle mask. He did not like the full face mask.   Dry Nose or Mouth: Sometimes wakes with a dry mouth  Condensation in hose or mask: No  Nasal/Skin irritation: No     ResMed AirSense 10  Auto-PAP 5-15 cmH2O: 11/10/2024-12/09/2024  The compliance data shows that the patient used the CPAP for 30/30 nights, 100% of nights for >4 hours.  The 95th% pressure is 10.8 cm.  The 95th% leak is 18.8 lpm.  The average nightly usage is 7 hours 1 minute.  The average AHI is 0.4 events/hr.    SLEEP-WAKE SCHEDULE:     Work/School Days: Patient goes to school/work: Yes   Usually gets into bed at 9:30-10 PM  Takes patient about within 30 minutes to fall asleep  Has trouble falling asleep   nights per week  Wakes up in the middle of the night multiple times. He tosses and turns multiple times after about 4 AM.    Wakes up due to his mind can race  He has trouble falling back asleep   times a week.   It usually takes   to get back to sleep  Patient is usually up at 4-5 AM on Tuesdays and Thursdays to donate plasma, MWF 5:20 AM  Uses alarm:  Yes    Weekends/Non-work Days/All Other Days:  Usually gets into bed at 9:30-10 PM  Takes patient about within 30 minutes to fall asleep  Patient is usually up at 7 AM at the latest  Uses alarm: No     Sleep Need  Patient gets about 7 hours of sleep on average   Patient thinks he needs about 7 hours of sleep    Ata Garcia prefers to sleep in this position(s): sides   Patient states they do the following activities in bed:      Naps  Patient takes a purposeful nap 0 times a week and naps are usually   in duration  He feels better after a nap:    He dozes off unintentionally 0 days per week  Patient has had a driving accident or near-miss due to sleepiness/drowsiness: No    SLEEP DISRUPTIONS:    Breathing/Snoring  Patient snores: No   Other people complain about his snoring: No  Patient has been told he stops breathing in his sleep: No  He has issues with the following:      Movement:  Patient gets pain, discomfort, with an urge to move: His feet will occasionally feel restless in his feet. He occasionally gets back pain at night.   It happens when he is resting:     It happens more at night:     Patient has been told he kicks his legs at night: No     Behaviors in Sleep:  Ata Garcia has experienced the following behaviors while sleeping: He thinks he may clench his jaw at night. He describes experiencing hypnic jerks occasionally.    He has experienced sudden muscle weakness during the day:    Pt denies sleep talking, sleep walking, and dream enactment behavior. Pt denies sleep paralysis, hypnagogue and cataplexy.    Is there anything else you would like your sleep provider to know:      CAFFEINE AND OTHER SUBSTANCES:    Patient consumes caffeinated beverages per day: 2  Last caffeine use is usually: in the morning  List of any prescribed or over the counter stimulants that patient takes: None  List of any prescribed or over the counter sleep medication patient takes: None  List of previous sleep medications  that patient has tried: Advil PM   Patient drinks alcohol to help them sleep: No  Patient drinks alcohol near bedtime: No    Family History:  Patient has a family member been diagnosed with a sleep disorder:        Social History: He lives at home with his wife.     SCALES:    EPWORTH SLEEPINESS SCALE         12/10/2024     1:46 PM    [place] Sleepiness Scale ( HE Veliz  [id number]<br>ESS - [place]/English - Final version - 21 Nov 07 - [facility] [place].)   Sitting and reading Slight chance of dozing   Watching TV Slight chance of dozing   Sitting, inactive in a public place (e.g. a theatre or a meeting) Would never doze   As a passenger in a car for an hour without a break Slight chance of dozing   Lying down to rest in the afternoon when circumstances permit Would never doze   Sitting and talking to someone Would never doze   Sitting quietly after a lunch without alcohol Would never doze   In a car, while stopped for a few minutes in traffic Would never doze   [place] Score (MC) 3   [place] Score (Sleep) 3        Patient-reported         INSOMNIA SEVERITY INDEX (RON)          12/10/2024     1:48 PM   Insomnia Severity Index (RON)   Difficulty falling asleep 0    Difficulty staying asleep 2    Problems waking up too early 2    How SATISFIED/DISSATISFIED are you with your CURRENT sleep pattern? 1    How NOTICEABLE to others do you think your sleep problem is in terms of impairing the quality of your life? 0    How WORRIED/DISTRESSED are you about your current sleep problem? 1    To what extent do you consider your sleep problem to INTERFERE with your daily functioning (e.g. daytime fatigue, mood, ability to function at work/daily chores, concentration, memory, mood, etc.) CURRENTLY? 0    RON Total Score 6        Patient-reported       Guidelines for Scoring/Interpretation:  Total score categories:  0-7 = No clinically significant insomnia   8-14 = Subthreshold insomnia   15-21 = Clinical insomnia (moderate  "severity)  22-28 = Clinical insomnia (severe)  Used via courtesy of www.myhealth.va.gov with permission from King Miller PhD., [facility]      STOP BANG         12/10/2024     1:41 PM   STOP BANG Questionnaire (  2008, the American Society of Anesthesiologists, Inc. [employer]iva [employer].)   B/P Clinic: --   BMI Clinic: 33.23         GAD7         No data to display                  CAGE-AID         No data to display                CAGE-AID reprinted with permission from the [place] Medical Journal, ERROL Mayer and RG Loredo, \"Conjoint screening questionnaires for alcohol and drug abuse\" [place] Medical Journal 94: 135-140, 1995.      PATIENT HEALTH QUESTIONNAIRE-9 (PHQ - 9)         No data to display                Developed by Mallorie Puri, Nena Paniagua, Lamin Pressley and colleagues, with an educational azeb from [employer] No permission required to reproduce, translate, display or distribute.        Allergies:    No Known Allergies    Medications:    Current Outpatient Medications   Medication Sig Dispense Refill    acetaminophen (TYLENOL) 325 MG tablet Take 650 mg by mouth      ibuprofen (ADVIL/MOTRIN) 200 MG tablet Take 200 mg by mouth every 4 hours as needed for mild pain      sildenafil (VIAGRA) 100 MG tablet TAKE ONE-HALF TO ONE TABLET BY MOUTH 30 MINUTES TO 4 HOURS BEFORE SEXUAL ACTIVITY 20 tablet 3       Problem List:  Patient Active Problem List    Diagnosis Date Noted    Obesity, Class I, BMI 30-34.9 09/13/2016     Priority: Medium    Ocular hypertension 10/24/2011     Priority: Medium     Target IOP:    Peak IOP: 30  GDX: prob nl  HVF:    Pachymetry: thick  C/D: 0.40.4  SLT:          CARDIOVASCULAR SCREENING; LDL GOAL LESS THAN 160 10/31/2010     Priority: Medium        Past Medical/Surgical History:  Past Medical History:   Diagnosis Date    Hypercholesterolemia     RAJIV (obstructive sleep apnea)     treated surgically    Prostatitis      Past Surgical " History:   Procedure Laterality Date    COLONOSCOPY  10/5/2016    COLONOSCOPY WITH CO2 INSUFFLATION N/A 10/05/2016    Procedure: COLONOSCOPY WITH CO2 INSUFFLATION;  Surgeon: Glenn Joiner DO;  Location: [facility] OR    HC REMOVAL GALLBLADDER  10/01/2004    TONSILLECTOMY & ADENOIDECTOMY  01/01/2001    for RAJIV    UVULECTOMY  01/01/2001    for RAJIV    VASECTOMY  01/01/2002       Social History:  Social History     Socioeconomic History    Marital status:      Spouse name: Not on file    Number of children: 3    Years of education: Not on file    Highest education level: Not on file   Occupational History    Not on file   Tobacco Use    Smoking status: Never    Smokeless tobacco: Never    Tobacco comments:     Never a smoker   Vaping Use    Vaping status: Never Used   Substance and Sexual Activity    Alcohol use: Yes     Comment: occasionally    Drug use: No    Sexual activity: Yes     Partners: Female   Other Topics Concern    Parent/sibling w/ CABG, MI or angioplasty before 65F 55M? No   Social History Narrative    Not on file     Social Drivers of Health     Financial Resource Strain: Not on file   Food Insecurity: Not on file   Transportation Needs: Not on file   Physical Activity: Not on file   Stress: Not on file   Social Connections: Not on file   Interpersonal Safety: Not on file   Housing Stability: Not on file       Family History:  Family History   Problem Relation Age of Onset    Lymphoma Father         non-H    Diabetes Father         type 2    Breast Cancer Sister     Cancer Sister     Diabetes Sister     Diabetes Maternal Grandfather         type 1    Diabetes Mother     Glaucoma No family hx of     Macular Degeneration No family hx of     Hypertension No family hx of     Cerebrovascular Disease No family hx of     Thyroid Disease No family hx of     Coronary Artery Disease No family hx of        Review of Systems:  A complete review of systems reviewed by me is negative with the exeption of what has  "been mentioned in the history of present illness.    Physical Examination:  Vitals: Ht 1.829 m (6')   Wt 111.1 kg (245 lb)   BMI 33.23 kg/m    BMI= Body mass index is 33.23 kg/m .    GENERAL APPEARANCE: healthy, alert, no distress, and cooperative  EYES: Eyes grossly normal to inspection  NECK: no asymmetry, masses, or scars  RESP: no increased work of breathing noted, no audible cough or wheeze   NEURO: mentation intact and speech normal  PSYCH: mentation appears normal and affect normal/bright  Mallampati Class: Not visualized over video.  Tonsillar Stage: Not visualized over video.         Data: All pertinent previous laboratory data reviewed     Recent Labs   Lab Test 12/07/22  0948 10/07/20  1150    138   POTASSIUM 4.2 4.2   CHLORIDE 107 107   CO2 30 26   ANIONGAP 4 5   * 109*   BUN 15 13   CR 1.06 0.96   KATHLEEN 8.9 8.5       Recent Labs   Lab Test 10/11/21  1036   WBC 7.7   RBC 5.48   HGB 16.0   HCT 46.7   MCV 85   MCH 29.2   MCHC 34.3   RDW 13.5          Recent Labs   Lab Test 12/07/22  0948   PROTTOTAL 6.7*   ALBUMIN 3.8   BILITOTAL 0.7   ALKPHOS 107   AST 26   ALT 46       No results found for: \"TSH\"    No results found for: \"UAMP\", \"UBARB\", \"BENZODIAZEUR\", \"UCANN\", \"UCOC\", \"OPIT\", \"UPCP\"    No results found for: \"IRONSAT\", \"MH77883\", \"GRACE\"    No results found for: \"PH\", \"PHARTERIAL\", \"PO2\", \"GD9EKDIHLMX\", \"SAT\", \"PCO2\", \"HCO3\", \"BASEEXCESS\", \"ASIF\", \"BEB\"    @LABRCNTIPR(phv:4,pco2v:4,po2v:4,hco3v:4,sylvester:4,o2per:4)@    Echocardiology: No results found for this or any previous visit (from the past 4320 hours).    Chest x-ray:   No results found for this or any previous visit from the past 365 days.      Chest CT:   No results found for this or any previous visit from the past 365 days.      PFT: Most Recent [facility] Pulmonary Function Testing    WILLY Chavez CNP 12/10/2024   "

## 2024-12-10 NOTE — NURSING NOTE
Current patient location: [address] [place] [id number]    Is the patient currently in the state of [place]? YES    Visit mode:VIDEO    If the visit is dropped, the patient can be reconnected by:VIDEO VISIT: Send to e-mail at: [email or web address]    Will anyone else be joining the visit? NO  (If patient encounters technical issues they should call [phone]    Are changes needed to the allergy or medication list? No    Are refills needed on medications prescribed by this physician? NO    Rooming Documentation:  Questionnaire(s) completed    Reason for visit: Consult    Shu OWENS

## 2024-12-26 ENCOUNTER — OFFICE VISIT (OUTPATIENT)
Dept: OPTOMETRY | Facility: CLINIC | Age: 54
End: 2024-12-26
Payer: COMMERCIAL

## 2024-12-26 DIAGNOSIS — Z01.00 EXAMINATION OF EYES AND VISION: Primary | ICD-10-CM

## 2024-12-26 DIAGNOSIS — H52.223 REGULAR ASTIGMATISM OF BOTH EYES: ICD-10-CM

## 2024-12-26 DIAGNOSIS — H52.13 MYOPIA OF BOTH EYES: ICD-10-CM

## 2024-12-26 DIAGNOSIS — H52.4 PRESBYOPIA: ICD-10-CM

## 2024-12-26 ASSESSMENT — CONF VISUAL FIELD
OD_INFERIOR_NASAL_RESTRICTION: 0
OS_INFERIOR_NASAL_RESTRICTION: 0
OD_SUPERIOR_NASAL_RESTRICTION: 0
OD_INFERIOR_TEMPORAL_RESTRICTION: 0
OD_SUPERIOR_TEMPORAL_RESTRICTION: 0
OS_SUPERIOR_TEMPORAL_RESTRICTION: 0
OS_NORMAL: 1
OS_SUPERIOR_NASAL_RESTRICTION: 0
OD_NORMAL: 1
OS_INFERIOR_TEMPORAL_RESTRICTION: 0

## 2024-12-26 ASSESSMENT — TONOMETRY
OS_IOP_MMHG: 21
OD_IOP_MMHG: 21
IOP_METHOD: APPLANATION

## 2024-12-26 ASSESSMENT — REFRACTION_MANIFEST
OS_CYLINDER: +1.25
OS_ADD: +2.25
OD_SPHERE: -1.75
OD_ADD: +2.25
OD_AXIS: 155
OD_CYLINDER: +0.50
OS_SPHERE: -1.75
OS_AXIS: 017

## 2024-12-26 ASSESSMENT — VISUAL ACUITY
METHOD: SNELLEN - LINEAR
OD_SC+: -1
OS_SC+: -1
OD_SC: 20/25
CORRECTION_TYPE: GLASSES
OD_CC: 20/25
OS_SC: 20/30
OS_CC+: -3
OS_SC: 20/50
OS_CC: 20/25
OD_SC: 20/50

## 2024-12-26 ASSESSMENT — EXTERNAL EXAM - RIGHT EYE: OD_EXAM: BROW PTOSIS

## 2024-12-26 ASSESSMENT — REFRACTION_WEARINGRX
OS_SPHERE: -2.25
SPECS_TYPE: SVL
OS_AXIS: 017
OD_SPHERE: -2.00
OD_AXIS: 155
OD_CYLINDER: +0.50
OS_CYLINDER: +0.75

## 2024-12-26 ASSESSMENT — PACHYMETRY
OS_CT(UM): 624
OD_CT(UM): 624

## 2024-12-26 ASSESSMENT — KERATOMETRY
OD_AXISANGLE2_DEGREES: 083
OS_K1POWER_DIOPTERS: 44.00
OS_AXISANGLE_DEGREES: 012
OD_AXISANGLE_DEGREES: 173
OD_K1POWER_DIOPTERS: 44.25
OS_K2POWER_DIOPTERS: 45.25
OD_K2POWER_DIOPTERS: 44.75
OS_AXISANGLE2_DEGREES: 102

## 2024-12-26 ASSESSMENT — SLIT LAMP EXAM - LIDS
COMMENTS: DERMATOCHALASIS
COMMENTS: DERMATOCHALASIS

## 2024-12-26 ASSESSMENT — EXTERNAL EXAM - LEFT EYE: OS_EXAM: BROW PTOSIS

## 2024-12-26 ASSESSMENT — CUP TO DISC RATIO
OD_RATIO: 0.35
OS_RATIO: 0.3

## 2024-12-26 NOTE — LETTER
12/26/2024      Ata Garcia  [address]  [facility] [address]      Dear Colleague,    Thank you for referring your patient, Ata Garcia, to the [facility]. Please see a copy of my visit note below.    Chief Complaint   Patient presents with     Annual Eye Exam         Last Eye Exam: 6-1-2021  Dilated Previously: Yes    What are you currently using to see?  glasses       Distance Vision Acuity: Satisfied with vision    Near Vision Acuity: Satisfied with vision while reading  unaided    Eye Comfort: good  Do you use eye drops? 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 Santiago- thick corneas both eyes     Latrice Jackman Optometric Assistant, A.B.O.C.      Medical, surgical and family histories reviewed and updated 12/26/2024.       OBJECTIVE: See Ophthalmology exam    ASSESSMENT:    ICD-10-CM    1. Examination of eyes and vision  Z01.00 EYE EXAM (SIMPLE-NONBILLABLE)      2. Myopia of both eyes  H52.13 REFRACTION      3. Regular astigmatism of both eyes  H52.223 REFRACTION      4. Presbyopia  H52.4 REFRACTION          PLAN:     Patient Instructions   Eyeglass prescription given.    Return in 1 year for a complete eye exam or sooner if needed.    Paulino Sanchez, PAUL         Again, thank you for allowing me to participate in the care of your patient.        Sincerely,        Paulino Sanchez, OD    Electronically signed

## 2024-12-26 NOTE — PATIENT INSTRUCTIONS
Eyeglass prescription given.    Return in 1 year for a complete eye exam or sooner if needed.    Paulino Sanchez, PAUL    The affects of the dilating drops last for 4- 6 hours.  You will be more sensitive to light and vision will be blurry up close.  Do not drive if you do not feel comfortable.  Mydriatic sunglasses were given if needed.      Optometry Providers       Clinic Locations                                 Telephone Number   Dr. Estelle Mead    [place]   [facility]  [place] [phone]     [facility] Hours:                [place] [facility] Hours:       [facility] Hours:   [address]   [address]     [address] [place] [address]   [place] [address]    [place] [address]  Phone: [phone]                    Phone: [phone]     Phone: [phone]                      Monday 8:00-6:00                          Monday 8:00-6:00                          Monday 8:00-6:00              Tuesday 8:00-6:00                          Tuesday 8:00-6:00                          Tuesday 8:00-6:00              Wednesday 8:00-6:00                  Wednesday 8:00-6:00                   Wednesday 8:00-6:00      Thursday 8:00-6:00                        Thursday 8:00-6:00                         Thursday 8:00-6:00            Friday 8:00-5:00                              Friday 8:00-5:00                              Friday 8:00-5:00    [facility] Hours:   [address] [place] [id number] [phone]    Monday 9:00-6:00  Tuesday 9:00-6:00  Wednesday 9:00-6:00  Thursday 9:00-6:00  Friday 9:00-5:00  As always, Thank you for trusting us with your health care needs!

## 2024-12-26 NOTE — PROGRESS NOTES
Chief Complaint   Patient presents with    Annual Eye Exam         Last Eye Exam: 6-1-2021  Dilated Previously: Yes    What are you currently using to see?  glasses       Distance Vision Acuity: Satisfied with vision    Near Vision Acuity: Satisfied with vision while reading  unaided    Eye Comfort: good  Do you use eye drops? 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 Santiago- thick corneas both eyes     Latrice Jackman Optometric Assistant, A.B.O.C.      Medical, surgical and family histories reviewed and updated 12/26/2024.       OBJECTIVE: See Ophthalmology exam    ASSESSMENT:    ICD-10-CM    1. Examination of eyes and vision  Z01.00 EYE EXAM (SIMPLE-NONBILLABLE)      2. Myopia of both eyes  H52.13 REFRACTION      3. Regular astigmatism of both eyes  H52.223 REFRACTION      4. Presbyopia  H52.4 REFRACTION          PLAN:     Patient Instructions   Eyeglass prescription given.    Return in 1 year for a complete eye exam or sooner if needed.    Paulino Sanchez, PAUL

## 2025-03-08 ENCOUNTER — HEALTH MAINTENANCE LETTER (OUTPATIENT)
Age: 55
End: 2025-03-08